# Patient Record
Sex: FEMALE | Race: WHITE | NOT HISPANIC OR LATINO
[De-identification: names, ages, dates, MRNs, and addresses within clinical notes are randomized per-mention and may not be internally consistent; named-entity substitution may affect disease eponyms.]

---

## 2018-05-08 ENCOUNTER — FORM ENCOUNTER (OUTPATIENT)
Age: 68
End: 2018-05-08

## 2018-06-26 ENCOUNTER — FORM ENCOUNTER (OUTPATIENT)
Age: 68
End: 2018-06-26

## 2018-07-17 ENCOUNTER — FORM ENCOUNTER (OUTPATIENT)
Age: 68
End: 2018-07-17

## 2019-01-01 ENCOUNTER — FORM ENCOUNTER (OUTPATIENT)
Age: 69
End: 2019-01-01

## 2019-05-13 ENCOUNTER — FORM ENCOUNTER (OUTPATIENT)
Age: 69
End: 2019-05-13

## 2020-06-23 ENCOUNTER — FORM ENCOUNTER (OUTPATIENT)
Age: 70
End: 2020-06-23

## 2021-06-11 ENCOUNTER — NON-APPOINTMENT (OUTPATIENT)
Age: 71
End: 2021-06-11

## 2021-06-23 ENCOUNTER — APPOINTMENT (OUTPATIENT)
Dept: BREAST CENTER | Facility: CLINIC | Age: 71
End: 2021-06-23
Payer: MEDICARE

## 2021-06-23 VITALS
OXYGEN SATURATION: 97 % | WEIGHT: 174.8 LBS | DIASTOLIC BLOOD PRESSURE: 75 MMHG | HEIGHT: 62.5 IN | HEART RATE: 69 BPM | SYSTOLIC BLOOD PRESSURE: 118 MMHG | BODY MASS INDEX: 31.36 KG/M2

## 2021-06-23 DIAGNOSIS — E11.9 TYPE 2 DIABETES MELLITUS W/OUT COMPLICATIONS: ICD-10-CM

## 2021-06-23 DIAGNOSIS — I10 ESSENTIAL (PRIMARY) HYPERTENSION: ICD-10-CM

## 2021-06-23 DIAGNOSIS — M51.26 OTHER INTERVERTEBRAL DISC DISPLACEMENT, LUMBAR REGION: ICD-10-CM

## 2021-06-23 DIAGNOSIS — Z80.3 FAMILY HISTORY OF MALIGNANT NEOPLASM OF BREAST: ICD-10-CM

## 2021-06-23 DIAGNOSIS — E78.00 PURE HYPERCHOLESTEROLEMIA, UNSPECIFIED: ICD-10-CM

## 2021-06-23 DIAGNOSIS — M19.90 UNSPECIFIED OSTEOARTHRITIS, UNSPECIFIED SITE: ICD-10-CM

## 2021-06-23 PROCEDURE — 99213 OFFICE O/P EST LOW 20 MIN: CPT

## 2021-06-23 NOTE — HISTORY OF PRESENT ILLNESS
[FreeTextEntry1] : 72 yo female previously under the care of Dr. Olivo (last eval was 6/24/2020) presents for a family history of breast cancer. Pt denies palpable masses, skin lesions/changes, nipple discharge, or other breast complaints.\par \par SLADE lifetime risk is 23.9%;Her family history is significant for breast cancer in her mother (dx age 65) and her m-aunt (dx age 50) and b-5-zx-cousin dx recent at age 30; no genetic testing has been completed. \par \par Discussed patients high risk status and recommendations for close surveillance. Patient understands and would like to be followed closely.\par \par Discussed importance and implication of genetic testing in regards to her family history and offspring. Patient would like to go forward with genetic counselor consult.\par \par Discussed self breast exams with the patient. Recommended simple pattern of palpation, while seated and while laying down.  Discussed characteristics of a suspicious mass, such as irregular, hard like a rock and fixed/immobile. Discussed that if she experiences nipple discharge, to come in sooner. Patient understands.\par

## 2021-06-23 NOTE — PAST MEDICAL HISTORY
[Menarche Age ____] : age at menarche was [unfilled] [Menopause Age____] : age at menopause was [unfilled] [Total Preg ___] : G[unfilled] [Live Births ___] : P[unfilled]  [Ectopics ___] : ectopic pregnancies: [unfilled]  [Age At Live Birth ___] : Age at live birth: [unfilled] [FreeTextEntry6] : yes [FreeTextEntry7] : no [FreeTextEntry8] : yes

## 2021-06-23 NOTE — DATA REVIEWED
[FreeTextEntry1] : 6/24/2020 ([facility]) b/l sMmg & US: heterogeneously dense. No mammographic or ultrasonographic evidence of malignancy. No significant change. BI-RADS 2.\par \par 6/23/21 ([facility]) b/l sMmg & US: Benign findings. No mammographic or ultrasonographic evidence of malignancy. No significant change. BI-RADS 2.

## 2021-06-24 ENCOUNTER — APPOINTMENT (OUTPATIENT)
Dept: BREAST CENTER | Facility: CLINIC | Age: 71
End: 2021-06-24

## 2021-12-08 ENCOUNTER — APPOINTMENT (OUTPATIENT)
Dept: BREAST CENTER | Facility: CLINIC | Age: 71
End: 2021-12-08

## 2021-12-08 ENCOUNTER — APPOINTMENT (OUTPATIENT)
Dept: HEMATOLOGY ONCOLOGY | Facility: CLINIC | Age: 71
End: 2021-12-08

## 2021-12-08 ENCOUNTER — NON-APPOINTMENT (OUTPATIENT)
Age: 71
End: 2021-12-08

## 2021-12-08 DIAGNOSIS — R92.2 INCONCLUSIVE MAMMOGRAM: ICD-10-CM

## 2022-10-06 ENCOUNTER — APPOINTMENT (OUTPATIENT)
Dept: BREAST CENTER | Facility: CLINIC | Age: 72
End: 2022-10-06

## 2022-10-06 VITALS
SYSTOLIC BLOOD PRESSURE: 126 MMHG | WEIGHT: 168.25 LBS | BODY MASS INDEX: 30.19 KG/M2 | HEART RATE: 75 BPM | DIASTOLIC BLOOD PRESSURE: 76 MMHG | HEIGHT: 62.5 IN

## 2022-10-06 DIAGNOSIS — Z78.9 OTHER SPECIFIED HEALTH STATUS: ICD-10-CM

## 2022-10-06 PROBLEM — R92.2 DENSE BREAST TISSUE: Status: ACTIVE | Noted: 2022-10-06

## 2022-10-06 PROCEDURE — 99213 OFFICE O/P EST LOW 20 MIN: CPT

## 2022-10-06 RX ORDER — METFORMIN HYDROCHLORIDE 625 MG/1
TABLET ORAL
Refills: 0 | Status: ACTIVE | COMMUNITY

## 2022-10-06 RX ORDER — ASPIRIN 81 MG
81 TABLET, DELAYED RELEASE (ENTERIC COATED) ORAL
Refills: 0 | Status: ACTIVE | COMMUNITY

## 2022-10-06 RX ORDER — PROPRANOLOL HYDROCHLORIDE 80 MG/1
TABLET ORAL
Refills: 0 | Status: ACTIVE | COMMUNITY

## 2022-10-06 RX ORDER — FERROUS SULFATE 325(65) MG
TABLET ORAL
Refills: 0 | Status: ACTIVE | COMMUNITY

## 2022-10-06 RX ORDER — SITAGLIPTIN AND METFORMIN HYDROCHLORIDE 50; 1000 MG/1; MG/1
50-1000 TABLET, FILM COATED ORAL
Refills: 0 | Status: ACTIVE | COMMUNITY

## 2022-10-06 RX ORDER — ATORVASTATIN CALCIUM 80 MG/1
80 TABLET, FILM COATED ORAL
Refills: 0 | Status: ACTIVE | COMMUNITY

## 2022-10-06 RX ORDER — PAROXETINE HYDROCHLORIDE 40 MG/1
TABLET, FILM COATED ORAL
Refills: 0 | Status: ACTIVE | COMMUNITY

## 2022-10-06 RX ORDER — DEXLANSOPRAZOLE 60 MG/1
60 CAPSULE, DELAYED RELEASE ORAL
Refills: 0 | Status: ACTIVE | COMMUNITY

## 2022-10-06 RX ORDER — VITAMIN B COMPLEX
CAPSULE ORAL
Refills: 0 | Status: ACTIVE | COMMUNITY

## 2022-10-06 RX ORDER — ERGOCALCIFEROL (VITAMIN D2) 1250 MCG
50000 CAPSULE ORAL
Refills: 0 | Status: ACTIVE | COMMUNITY

## 2022-10-06 RX ORDER — LORAZEPAM 2 MG/1
TABLET ORAL
Refills: 0 | Status: ACTIVE | COMMUNITY

## 2022-10-11 NOTE — PAST MEDICAL HISTORY
[Menarche Age ____] : age at menarche was [unfilled] [Menopause Age____] : age at menopause was [unfilled] [Total Preg ___] : G[unfilled] [Live Births ___] : P[unfilled]  [Ectopics ___] : ectopic pregnancies: [unfilled]  [Age At Live Birth ___] : Age at live birth: [unfilled] [History of Hormone Replacement Treatment] : has no history of hormone replacement treatment [FreeTextEntry6] : yes [FreeTextEntry7] : no [FreeTextEntry8] : yes

## 2022-10-11 NOTE — HISTORY OF PRESENT ILLNESS
[FreeTextEntry1] : 73 yo female presents for follow up with a family history of breast cancer. She complains of a new palpable lump in her right breast, first noticed about a month ago, also noticed by her PCP. Pt denies skin lesions/changes, nipple discharge, or other breast complaints.\par \par SLADE lifetime risk is 23.9%;Her family history is significant for breast cancer in her mother (dx age 65) and her m-aunt (dx age 50) and v-8-pm-cousin dx recent at age 30; no genetic testing has been completed. \par \par Discussed patients high risk status and recommendations for close surveillance. Patient understands and would like to be followed closely.\par \par Discussed importance and implication of genetic testing in regards to her family history and offspring. Patient would like to go forward with genetic counselor consult.\par \par Discussed self breast exams with the patient. Recommended simple pattern of palpation, while seated and while laying down.  Discussed characteristics of a suspicious mass, such as irregular, hard like a rock and fixed/immobile. Discussed that if she experiences nipple discharge, to come in sooner. Patient understands.\par

## 2022-10-11 NOTE — DATA REVIEWED
[FreeTextEntry1] : 6/24/2020 ([facility]) b/l sMmg & US: heterogeneously dense. No mammographic or ultrasonographic evidence of malignancy. No significant change. BI-RADS 2.\par \par 6/23/21 ([facility]) b/l sMmg & US: Benign findings. No mammographic or ultrasonographic evidence of malignancy. No significant change. BI-RADS 2.

## 2022-10-14 ENCOUNTER — NON-APPOINTMENT (OUTPATIENT)
Age: 72
End: 2022-10-14

## 2022-10-18 ENCOUNTER — NON-APPOINTMENT (OUTPATIENT)
Age: 72
End: 2022-10-18

## 2022-10-19 ENCOUNTER — NON-APPOINTMENT (OUTPATIENT)
Age: 72
End: 2022-10-19

## 2022-10-20 ENCOUNTER — RESULT REVIEW (OUTPATIENT)
Age: 72
End: 2022-10-20

## 2022-10-21 ENCOUNTER — NON-APPOINTMENT (OUTPATIENT)
Age: 72
End: 2022-10-21

## 2022-10-24 ENCOUNTER — APPOINTMENT (OUTPATIENT)
Dept: BREAST CENTER | Facility: CLINIC | Age: 72
End: 2022-10-24

## 2022-10-24 PROCEDURE — 99212 OFFICE O/P EST SF 10 MIN: CPT

## 2022-10-24 NOTE — DATA REVIEWED
[FreeTextEntry1] : 6/24/2020 ([facility]) b/l sMmg & US: heterogeneously dense. No mammographic or ultrasonographic evidence of malignancy. No significant change. BI-RADS 2.\par \par 6/23/21 ([facility]) b/l sMmg & US: Benign findings. No mammographic or ultrasonographic evidence of malignancy. No significant change. BI-RADS 2. \par \par 10/6/22 ([facility]) b/l dx mmg & US: heterogeneously dense. 1. Suspicious palpable architectural distortion at the right 11-12:00 axis which likely correlates with a region of abnormal echotexture spanning 1.5 cm on ultrasound at the 11:00 axis 4 cm from the nipple. Stereotactic biopsy is recommended. In addition clinical evaluation/surgical evaluation is recommended for the palpable finding.\par 2. Suspicious left 8:00 axis 1 cm from nipple 1 cm complex cluster of microcysts which had not been previously visualized. Ultrasound-guided needle biopsy is recommended.\par BI-RADS 4. \par \par 10/20/22 ([facility]) right stereo biopsy: results pending \par \par 10/20/22 ([facility]) US biopsy left: results pending

## 2022-10-24 NOTE — PHYSICAL EXAM
[Examined in the supine and seated position] : examined in the supine and seated position [No Axillary Lymphadenopathy] : no left axillary lymphadenopathy [No Edema] : no edema [Breast Nipple Inversion] : nipples not inverted [Breast Nipple Retraction] : nipples not retracted [Breast Nipple Flattening] : nipples not flattened [Breast Nipple Fissures] : nipples not fissured [de-identified] : ecchymosis bilaterally to biopsy sites [de-identified] : healing ecchymosis, with palpable mass at biopsy site, likely hematoma, measuring 3 cm; multiple small blisters to the breast

## 2022-10-24 NOTE — ASSESSMENT
[FreeTextEntry1] : 73 yo female presents for complaints of ecchymosis, pain, and a palpable lump and blisters on her right breast s/p biopsies on 10/20/22; I reviewed with her that the blisters are likely an allergic reaction to the adhesive that was on her breasts; they seem to be resolving so she was advised to keep the area clean, dry, and covered. \par \par Given that she was unable to come off blood thinners for her biopsy, reviewed that the palpable lump is likely a hematoma. Does not appear to be actively bleeding. Provided her with a compression bra and showed her how to apply extra compression with a dressing. She states that this is much more compressive than the ace bandage or bras she was wearing previously. Reviewed the use of cold compresses to help the healing process. Will touch base with her via telehealth in a few days to see if it is resolving. The patient verbalized understanding.

## 2022-10-24 NOTE — HISTORY OF PRESENT ILLNESS
[FreeTextEntry1] : 73 yo female presents for complaints of right pain and skin changes s/p right stereo on 10/20/22; she reports that she had blisters after removing the tape that was placed. States that the blisters have been getting smaller. She has been wearing a regular bra during the day, but states that for two days after the biopsy she was wearing an ace bandage. Denies fevers or chills. \par \par SLADE lifetime risk is 23.9%;Her family history is significant for breast cancer in her mother (dx age 65) and her m-aunt (dx age 50) and v-2-im-cousin dx recent at age 30; no genetic testing has been completed. \par \par Of note, the patient is on blood thinners, was unable to stop them prior to the biopsy (advised by her cardiologist)

## 2022-10-24 NOTE — REVIEW OF SYSTEMS
[Negative] : Heme/Lymph [As Noted in HPI] : as noted in HPI [Breast Pain] : breast pain [Breast Lump] : breast lump [Skin Lesions] : no skin lesions [Skin Wound] : no skin wound

## 2022-10-25 ENCOUNTER — NON-APPOINTMENT (OUTPATIENT)
Age: 72
End: 2022-10-25

## 2022-10-27 ENCOUNTER — NON-APPOINTMENT (OUTPATIENT)
Age: 72
End: 2022-10-27

## 2022-10-27 ENCOUNTER — APPOINTMENT (OUTPATIENT)
Dept: BREAST CENTER | Facility: CLINIC | Age: 72
End: 2022-10-27

## 2022-10-28 ENCOUNTER — APPOINTMENT (OUTPATIENT)
Dept: HEMATOLOGY ONCOLOGY | Facility: CLINIC | Age: 72
End: 2022-10-28

## 2022-10-28 ENCOUNTER — NON-APPOINTMENT (OUTPATIENT)
Age: 72
End: 2022-10-28

## 2022-10-28 NOTE — DISCUSSION/SUMMARY
[FreeTextEntry1] : The visit was provided via telehealth using real-time 2-way audio visual technology. The patient, Candy Dhillon, was located at home in [place], [place] at the time of the visit. The provider, Lani Lua, was located at the medical office in [place], [place] at the time of the visit. Verbal consent for telehealth services was given on 10/28/22 by the patient, Candy Dhillon.\par \par REASON FOR CONSULT\par Candy Dhillon is a 72-year-old female referred by Dr. Carole Walter for cancer genetic counseling and risk assessment due to a new diagnosis of breast cancer. Ms. Dhillon was seen via telehealth on 2022 at which time medical and family history was ascertained and a pedigree constructed. \par \par RELEVANT MEDICAL HISTORY\par Ms. Dhillon was recently diagnosed with a right breast cancer at the age of 72. Pathology report revealed invasive lobular carcinoma (ER+/AL+/HER2-) with LCIS. She is scheduled to meet with Dr. Garcia next week to discuss treatment planning. \par \par OTHER MEDICAL AND SURGICAL HISTORY:\par •	Medical History: DMII, HTN, HLD, heart issues\par •	Surgical History: , ectopic pregnancy\par \par OB/GYN HISTORY:\par Obstetrical History: \par Age at Menarche: 13\par Menopausal Status: Post-menopausal with LMP at age 56\par Age at First Live Birth: 33\par Oral Contraceptive Use: No\par Hormone Replacement Therapy: No\par \par CANCER SCREENING HISTORY:  \par Breast: \par •	Mammography/Sonography: 10/6/22- rec left and right biopsies\par •	Biopsies: 10/20/22- Right- ILC/LCIS, left- cystic apocrine metaplasia\par GYN:\par •	Pelvic Examination: Annual- reportedly wnl, history of clomid use \par Colon:\par •	Colonoscopy: - reportedly wnl, 1 polyp detected previously (pathology unknown), repeat in 5 years\par Skin:  \par •	FBSE: Yes\par •	Lesions biopsied/removed: No\par \par SOCIAL HISTORY:\par •	Tobacco-product use: No\par •	Environmental exposures: No \par \par FAMILY HISTORY:\par Maternal and paternal ancestry was reported as [place]. Sephardic Mosque ancestry was confirmed. A detailed family history of cancer was ascertained, see below and scanned chart for pedigree. \par \par According to Ms. Dhillon no one in the family has had germline testing for cancer susceptibility. Consanguinity was denied. \par 	\par RISK ASSESSMENT:\par Ms. Dhillon’s personal and family history is suggestive of a hereditary cancer syndrome given her new diagnosis of breast cancer, mother’s history of breast cancer, maternal aunt with breast cancer, and maternal cousin with breast cancer in her early 30s. The patient meets National Comprehensive Cancer Network (NCCN) criteria for genetic testing. Given that she plans to make surgical decisions based on results from genetic testing, we recommended the [facility] Breast STAT Panel testing for genes associated with breast cancer (typically results within 5-12 days). This test analyzes 9 genes: MICKY, BRCA1, BRCA2, CDH1, CHEK2, PALB2, PTEN, STK11, and TP53.\par \par The risks, benefits and limitations of genetic testing were discussed with Ms. Dhillon. In addition, we discussed the purpose of genetic testing and possible test results (positive, negative, inconclusive) along with associated medical management options and psychosocial implications. Insurance coverage and potential out of pocket costs were also discussed. \par \par It was explained that risk assessment is based upon medical and family history as provided and may change in the future should new information be obtained. \par \par Following our discussion, Ms. Dhillon consented to the above-mentioned genetic testing panel. Ms. Dhillon stated she would prefer to submit a blood sample for testing. Blood will be drawn at her appointment with Dr. Garcia on  and sent to [facility] for analysis.\par \par PLAN:\par \par 1.	Blood will be drawn at her appointment with Dr. Garcia on  and sent to [facility] for analysis. \par 2.	We will contact Ms. Dhillon to schedule a follow-up appointment once the results are available. Results generally return in 5-12 days after the lab has received the patient’s sample. \par \par For any additional questions please call [facility] Genetics at [phone]. \par \par \par Lani Lua MS, [facility]\par Genetic Counselor, Cancer Genetics\par \par \par \par \par \par

## 2022-11-01 ENCOUNTER — APPOINTMENT (OUTPATIENT)
Dept: BREAST CENTER | Facility: CLINIC | Age: 72
End: 2022-11-01
Payer: MEDICARE

## 2022-11-01 ENCOUNTER — TRANSCRIPTION ENCOUNTER (OUTPATIENT)
Age: 72
End: 2022-11-01

## 2022-11-01 VITALS
HEIGHT: 62 IN | DIASTOLIC BLOOD PRESSURE: 78 MMHG | BODY MASS INDEX: 30.55 KG/M2 | WEIGHT: 166 LBS | SYSTOLIC BLOOD PRESSURE: 136 MMHG | HEART RATE: 73 BPM

## 2022-11-01 DIAGNOSIS — N64.89 OTHER SPECIFIED DISORDERS OF BREAST: ICD-10-CM

## 2022-11-01 PROCEDURE — 76942 ECHO GUIDE FOR BIOPSY: CPT | Mod: RT

## 2022-11-01 PROCEDURE — 99215 OFFICE O/P EST HI 40 MIN: CPT | Mod: 25

## 2022-11-01 PROCEDURE — 10160 PNXR ASPIR ABSC HMTMA BULLA: CPT

## 2022-11-01 PROCEDURE — 93702 BIS XTRACELL FLUID ANALYSIS: CPT

## 2022-11-01 PROCEDURE — 76642 ULTRASOUND BREAST LIMITED: CPT

## 2022-11-04 NOTE — ASSESSMENT
[FreeTextEntry1] : 71 yo female presents for newly diagnosed R invasive lobular carcinoma % % Her-2 negative, LCIS and focally involving radial scar, seen on diagnostic imaging as architectural distortion at R 11:00, originally palpated by patient. Benign, concordant US-guided biopsy performed same day of L 0.9cm complex cyst with recommendation for 6 month follow-up US. \par \par Discussed importance and implication of genetic testing in regards to her family history and offspring. Patient met with BRETT Garibay via telehealth 10/28/22, will draw blood for [facility] genetic testing today. Enrolled patient in [employer] study, consent obtained and a copy given to patient. \par \par Patient with likely post-op hematoma noted in physical exam today, advised patient to switch from cold compresses to warm wet compresses 4x daily for 20-30 minutes each. Bedside US-guided R breast aspiration of hematoma performed in office, 3cc of aspirate removed without complication today. \par \par Discussed patient's diagnosis in detail and answered any questions. Patient instructed by cardiologist to continue for 6 months until December 14, 2022. Patient to consult with medical oncology to discuss possible neoadjuvant endocrine therapy until surgery can be performed. Patient will have B/L MRI to assess extent of disease and plan for R lumpectomy and SLNB, pending PCP clearance. Surgical consent obtained today, surgical coordinator aware, contact consent to speak with daughter obtained today. Baseline sozo measurement obtained today. Patient verbalized understanding and agreement of plan.\par \par

## 2022-11-04 NOTE — REVIEW OF SYSTEMS
[Breast Pain] : breast pain [Breast Lump] : breast lump [Negative] : Heme/Lymph [As Noted in HPI] : as noted in HPI [Skin Lesions] : no skin lesions [Skin Wound] : no skin wound

## 2022-11-04 NOTE — HISTORY OF PRESENT ILLNESS
[FreeTextEntry1] : 71 yo female presents for newly diagnosed R invasive lobular carcinoma % % Her-2 negative, LCIS and focally involving radial scar, seen on diagnostic imaging as architectural distortion at R 11:00 spanning 1.5cm on US, originally palpated by patient. Benign, concordant US-guided biopsy performed same day of L 0.9cm complex cyst with recommendation for 6 month follow-up US. Patient has not had a breast MRI. \par \par Patient last seen by Mesha Araujo NP in office 10/27/22 with complaints of right pain and skin changes s/p right stereo on 10/20/22, likely due to reaction to adhesive and post-procedure hematoma. \par \par Her family history is significant for breast cancer in her mother (dx age 65) and her m-aunt (dx age 50) and e-8-oh-cousin dx recent at age 30; patient met with BRETT Garibay via telehealth 10/28/22 with plan to proceed with bloodwork for [facility] genetic testing today.\par \par Of note, the patient is on blood thinners, was unable to stop them prior to the biopsy (advised by her cardiologist) \par \par Patient reports history of diabetes, HLD, HTN, tremor. Of note, patient on blood thinners Prasugrel and baby aspirin as patient had a stent placed in June 2022, instructed by cardiologist to continue for 6 months until December 14, 2022. Patient with allergy to penicillin injections rxn: syncope, tegaderm rxn: blisters, epinephrine, rxn; syncope. \par Patient with partial dentures in top and bottom. \par \par Patient denies history of sleep apnea, COPD, loose/missing/infected teeth, issues with anesthesia. \par

## 2022-11-04 NOTE — PHYSICAL EXAM
[Examined in the supine and seated position] : examined in the supine and seated position [No Axillary Lymphadenopathy] : no left axillary lymphadenopathy [No Edema] : no edema [Supple] : supple [No Supraclavicular Adenopathy] : no supraclavicular adenopathy [Breast Nipple Inversion] : nipples not inverted [Breast Nipple Retraction] : nipples not retracted [Breast Nipple Flattening] : nipples not flattened [Breast Nipple Fissures] : nipples not fissured [de-identified] : ecchymosis bilaterally to biopsy sites [de-identified] : healing ecchymosis, with palpable mass at biopsy site, likely hematoma, measuring 3 cm; multiple small blisters to the breast

## 2022-11-04 NOTE — PROCEDURE
[FreeTextEntry1] : US-guided Bedside R breast Aspiration [FreeTextEntry2] : Post-biopsy hematoma [FreeTextEntry3] : Technique: patient was prepped and draped, cleansed with alcohol, ultrasound gel applied, intended site of aspiration was located, using a 25G needle, a total of 5cc of 1% lidocaine/epinephrine was injected for local anesthetic, using ultrasound guidance, an 18G needle was introduced into site of hematoma with a total of 3cc of bloody fluid aspirated\par The patient tolerated the procedure well, no complications.\par

## 2022-11-04 NOTE — DATA REVIEWED
[FreeTextEntry1] : 6/24/2020 ([facility]) b/l sMmg & US: heterogeneously dense. No mammographic or ultrasonographic evidence of malignancy. No significant change. BI-RADS 2.\par \par 6/23/21 ([facility]) b/l sMmg & US: Benign findings. No mammographic or ultrasonographic evidence of malignancy. No significant change. BI-RADS 2. \par \par 10/6/22 ([facility]) b/l dx mmg & US: heterogeneously dense. 1. Suspicious palpable architectural distortion at the right 11-12:00 axis which likely correlates with a region of abnormal echotexture spanning 1.5 cm on ultrasound at the 11:00 axis 4 cm from the nipple. Stereotactic biopsy is recommended. In addition clinical evaluation/surgical evaluation is recommended for the palpable finding.\par 2. Suspicious left 8:00 axis 1 cm from nipple 1 cm complex cluster of microcysts which had not been previously visualized. Ultrasound-guided needle biopsy is recommended.\par BI-RADS 4. \par \par 10/20/22 ([facility]) right stereo biopsy of architectural distortion at the right 11:00 axis (hourglass clip): Invasive lobular carcinoma, 5 mm in greatest microscopic dimension. Lobular carcinoma in situ, focally involving radial scar. Benign calcifications. Malignant, concordant. \par \par 10/20/22 ([facility]) US biopsy left of  0.9 x 0.3 x 0.8 complex cyst at the left 8:00 axis 1cmfn (heart-shaped clip): Cystic apocrine metaplasia. Benign, concordant. FOLLOW-UP: A six-month follow up  ultrasound is recommended.

## 2022-11-16 ENCOUNTER — NON-APPOINTMENT (OUTPATIENT)
Age: 72
End: 2022-11-16

## 2022-11-28 ENCOUNTER — NON-APPOINTMENT (OUTPATIENT)
Age: 72
End: 2022-11-28

## 2022-12-05 ENCOUNTER — APPOINTMENT (OUTPATIENT)
Dept: HEMATOLOGY ONCOLOGY | Facility: CLINIC | Age: 72
End: 2022-12-05

## 2022-12-05 ENCOUNTER — LABORATORY RESULT (OUTPATIENT)
Age: 72
End: 2022-12-05

## 2022-12-05 VITALS
HEIGHT: 62 IN | WEIGHT: 165 LBS | HEART RATE: 72 BPM | BODY MASS INDEX: 30.36 KG/M2 | DIASTOLIC BLOOD PRESSURE: 70 MMHG | RESPIRATION RATE: 18 BRPM | TEMPERATURE: 98.6 F | OXYGEN SATURATION: 96 % | SYSTOLIC BLOOD PRESSURE: 127 MMHG

## 2022-12-05 DIAGNOSIS — Z01.818 ENCOUNTER FOR OTHER PREPROCEDURAL EXAMINATION: ICD-10-CM

## 2022-12-05 DIAGNOSIS — R92.8 OTHER ABNORMAL AND INCONCLUSIVE FINDINGS ON DIAGNOSTIC IMAGING OF BREAST: ICD-10-CM

## 2022-12-05 PROCEDURE — 99204 OFFICE O/P NEW MOD 45 MIN: CPT

## 2022-12-05 RX ORDER — NEOMYCIN AND POLYMYXIN B SULFATES, BACITRACIN ZINC, AND HYDROCORTISONE 3.5; 5000; 400; 1 MG/G; [IU]/G; [IU]/G; MG/G
OINTMENT TOPICAL
Refills: 0 | Status: DISCONTINUED | COMMUNITY
End: 2022-12-05

## 2022-12-05 RX ORDER — DAPAGLIFLOZIN 5 MG/1
5 TABLET, FILM COATED ORAL
Qty: 30 | Refills: 0 | Status: ACTIVE | COMMUNITY
Start: 2022-10-30

## 2022-12-05 RX ORDER — DENOSUMAB 60 MG/ML
60 INJECTION SUBCUTANEOUS
Qty: 1 | Refills: 0 | Status: ACTIVE | COMMUNITY
Start: 2022-11-30

## 2022-12-05 NOTE — HISTORY OF PRESENT ILLNESS
[de-identified] : 73yo woman with PMHx including DM, HTN, CAD s/p PCI (with stent placement in June 2022), osteopenia on Prolia (managed by endo, Dr José Luis Beckwith) referred by Dr Garcia for a newly diagnosed right ILC % % Her 2 negative, LCIS and focally involving radial scar, seen on 11/2022 MRI breast as a 1.7cm mass in right upper inner quadrant and an 8mm enhancing structure in right axillary tail w/o fatty hilum, possibly abnormal node, s/p US axilla 11/22/22 without any abnormal lymphadenopathy seen. \par \par Patient currently on Prasugrel and baby aspirin at least until 12/14/2022 due to a stent which was placed June 2022. Patient awaiting clearance from PMD and cardiologist prior to breast surgery. Patient referred by Dr Amadou Garcia for consideration of neoadjuvant antiestrogen therapy prior to surgery.  [de-identified] : Patient presents for initial consultation. Patient states they tested positive for Covid -19 last Monday and got prescribed a 3 day antibiotic. The antibiotics have been causing stomach cramping and diarrhea, but her doctor assured her it will pass after 10 days. Patient reports improvement in her loose stools/abd cramps, also reports resolution of cough, improvement in fatigue. [FreeTextEntry1] : 73 yo female presents for newly diagnosed R invasive lobular carcinoma % % Her-2 negative, LCIS and focally involving radial scar, seen on diagnostic imaging as architectural distortion at R 11:00 spanning 1.5cm on US, originally palpated by patient. Benign, concordant US-guided biopsy performed same day of L 0.9cm complex cyst with recommendation for 6 month follow-up US. Patient has not had a breast MRI. \par \par Patient last seen by Mesha Araujo NP in office 10/27/22 with complaints of right pain and skin changes s/p right stereo on 10/20/22, likely due to reaction to adhesive and post-procedure hematoma. \par \par Her family history is significant for breast cancer in her mother (dx age 65) and her m-aunt (dx age 50) and f-8-vk-cousin dx recent at age 30; patient met with BRETT Garibay via telehealth 10/28/22 with plan to proceed with bloodwork for [facility] genetic testing today.\par \par Of note, the patient is on blood thinners, was unable to stop them prior to the biopsy (advised by her cardiologist) \par \par Patient reports history of diabetes, HLD, HTN, tremor. Of note, patient on blood thinners Prasugrel and baby aspirin as patient had a stent placed in June 2022, instructed by cardiologist to continue for 6 months until December 14, 2022. Patient with allergy to penicillin injections rxn: syncope, tegaderm rxn: blisters, epinephrine, rxn; syncope. \par Patient with partial dentures in top and bottom. \par \par Patient denies history of sleep apnea, COPD, loose/missing/infected teeth, issues with anesthesia. \par

## 2022-12-05 NOTE — REVIEW OF SYSTEMS
[Cough] : cough [Abdominal Pain] : abdominal pain [Diarrhea] : diarrhea [Negative] : Allergic/Immunologic [FreeTextEntry6] : cough with covid, resolved  [FreeTextEntry7] : diarrhea with covid, getting better

## 2022-12-05 NOTE — ASSESSMENT
[FreeTextEntry1] : 71yo woman with PMHx including DM, HTN, CAD s/p PCI (with stent placement in June 2022), osteopenia on Prolia (managed by endo, Dr José Luis Beckwith) referred by Dr Garcia for a newly diagnosed right ILC % % Her 2 negative, LCIS and focally involving radial scar, seen on 11/2022 MRI breast as a 1.7cm mass in right upper inner quadrant and an 8mm enhancing structure in right axillary tail w/o fatty hilum, possibly abnormal node, s/p US axilla 11/22/22 without any abnormal lymphadenopathy seen. \par \par Patient currently on Prasugrel and baby aspirin at least until 12/14/2022 due to a stent which was placed June 2022. Patient awaiting clearance from PMD and cardiologist prior to breast surgery. Patient referred by Dr Amadou Garcia for consideration of neoadjuvant antiestrogen therapy prior to surgery. \par \par Pt s/p visit with genetic counselor Lani Lua, [facility] test result negative 11/2022. \par \par \par Reviewed general management of early stage hormone receptor positive breast cancer that is isolated to the breast with patient and her daughter in law who was present at initial visit on 12/5/22. I reviewed that surgery is the only curative modality and an integral part of her breast cancer treatment; once medically optimized and cleared for surgery (by PMD, cardiologist) she should undergo surgery with Dr Amadou Garcia. During the process of continuing dual antiplatelet therapy and being medically optimized for surgery, I recommended she undergo neoadjuvant endocrine therapy with Arimidex 1mg/day with periodic physical exams to assess response to therapy. I reviewed goal of antiestrogen therapy before surgery will be to shrink the mass to achieve a better surgical outcome and to give her time with more of a piece of mind to prepare for the surgery/complete dual antiplatelet therapy. I reviewed the potential side effects of Arimidex including but not limited to hot flashes, body aches and pains, decrease in bone density and worsened lipid profile. \par \par I asked patient to take Arimidex 1mg/day with vitamin D/Ca as well (which patient reported she is already on due to Prolia use). \par Will also obtain latest DEXA result from her endocrinologist. \par \par RTC in 6-8 weeks to assess response to therapy. \par \par

## 2022-12-21 ENCOUNTER — APPOINTMENT (OUTPATIENT)
Dept: NUCLEAR MEDICINE | Facility: HOSPITAL | Age: 72
End: 2022-12-21

## 2022-12-21 ENCOUNTER — RESULT REVIEW (OUTPATIENT)
Age: 72
End: 2022-12-21

## 2022-12-21 ENCOUNTER — OUTPATIENT (OUTPATIENT)
Dept: OUTPATIENT SERVICES | Facility: HOSPITAL | Age: 72
LOS: 1 days | End: 2022-12-21
Payer: MEDICARE

## 2022-12-21 ENCOUNTER — TRANSCRIPTION ENCOUNTER (OUTPATIENT)
Age: 72
End: 2022-12-21

## 2022-12-21 DIAGNOSIS — Z98.891 HISTORY OF UTERINE SCAR FROM PREVIOUS SURGERY: Chronic | ICD-10-CM

## 2022-12-21 DIAGNOSIS — Z87.59 PERSONAL HISTORY OF OTHER COMPLICATIONS OF PREGNANCY, CHILDBIRTH AND THE PUERPERIUM: Chronic | ICD-10-CM

## 2022-12-21 PROCEDURE — 78195 LYMPH SYSTEM IMAGING: CPT | Mod: 26,MD

## 2022-12-21 NOTE — ASU PATIENT PROFILE, ADULT - NSICDXPASTMEDICALHX_GEN_ALL_CORE_FT
PAST MEDICAL HISTORY:  2019 novel coronavirus disease (COVID-19) 3-4 weeks agouti    Anxiety and depression     Arthritis     Breast cancer     CAD (coronary artery disease)  stent placement    Chronic UTI     H/O dizziness     H/O urinary frequency     Heart attack mild     High blood pressure     Type 2 diabetes mellitus

## 2022-12-21 NOTE — ASU PATIENT PROFILE, ADULT - NSICDXPASTSURGICALHX_GEN_ALL_CORE_FT
PAST SURGICAL HISTORY:  S/P      S/P ectopic pregnancy      PAST SURGICAL HISTORY:  H/O excision of mass back tumor    S/P      S/P ectopic pregnancy     Stented coronary artery

## 2022-12-21 NOTE — ASU PATIENT PROFILE, ADULT - NS PREOP UNDERSTANDS INFO
Bring photo id , insurance card, no food after 22:00 today can have water till 05:30 AM, no chewing gum no candy after 2200 pm. Wear loose comfort cloths , no jewelry, no valuables./yes

## 2022-12-21 NOTE — ASU PATIENT PROFILE, ADULT - FALL HARM RISK - UNIVERSAL INTERVENTIONS
Bed in lowest position, wheels locked, appropriate side rails in place/Call bell, personal items and telephone in reach/Instruct patient to call for assistance before getting out of bed or chair/Non-slip footwear when patient is out of bed/[place] to call system/Physically safe environment - no spills, clutter or unnecessary equipment/Purposeful Proactive Rounding/Room/bathroom lighting operational, light cord in reach

## 2022-12-22 ENCOUNTER — TRANSCRIPTION ENCOUNTER (OUTPATIENT)
Age: 72
End: 2022-12-22

## 2022-12-22 ENCOUNTER — RESULT REVIEW (OUTPATIENT)
Age: 72
End: 2022-12-22

## 2022-12-22 ENCOUNTER — APPOINTMENT (OUTPATIENT)
Dept: BREAST CENTER | Facility: AMBULATORY SURGERY CENTER | Age: 72
End: 2022-12-22

## 2022-12-22 ENCOUNTER — OUTPATIENT (OUTPATIENT)
Dept: OUTPATIENT SERVICES | Facility: HOSPITAL | Age: 72
LOS: 1 days | Discharge: HOME | End: 2022-12-22

## 2022-12-22 VITALS
RESPIRATION RATE: 15 BRPM | OXYGEN SATURATION: 98 % | DIASTOLIC BLOOD PRESSURE: 75 MMHG | SYSTOLIC BLOOD PRESSURE: 132 MMHG | HEART RATE: 68 BPM

## 2022-12-22 VITALS
RESPIRATION RATE: 16 BRPM | TEMPERATURE: 98 F | SYSTOLIC BLOOD PRESSURE: 151 MMHG | WEIGHT: 162.92 LBS | HEART RATE: 70 BPM | HEIGHT: 62.5 IN | DIASTOLIC BLOOD PRESSURE: 70 MMHG | OXYGEN SATURATION: 97 %

## 2022-12-22 DIAGNOSIS — Z98.890 OTHER SPECIFIED POSTPROCEDURAL STATES: Chronic | ICD-10-CM

## 2022-12-22 DIAGNOSIS — Z98.891 HISTORY OF UTERINE SCAR FROM PREVIOUS SURGERY: Chronic | ICD-10-CM

## 2022-12-22 DIAGNOSIS — Z95.5 PRESENCE OF CORONARY ANGIOPLASTY IMPLANT AND GRAFT: Chronic | ICD-10-CM

## 2022-12-22 DIAGNOSIS — Z87.59 PERSONAL HISTORY OF OTHER COMPLICATIONS OF PREGNANCY, CHILDBIRTH AND THE PUERPERIUM: Chronic | ICD-10-CM

## 2022-12-22 LAB — GLUCOSE BLDC GLUCOMTR-MCNC: 102 MG/DL — HIGH (ref 70–99)

## 2022-12-22 PROCEDURE — 38900 IO MAP OF SENT LYMPH NODE: CPT | Mod: RT

## 2022-12-22 PROCEDURE — 88307 TISSUE EXAM BY PATHOLOGIST: CPT | Mod: 26

## 2022-12-22 PROCEDURE — 38525 BIOPSY/REMOVAL LYMPH NODES: CPT | Mod: RT

## 2022-12-22 PROCEDURE — A9541: CPT

## 2022-12-22 PROCEDURE — 78195 LYMPH SYSTEM IMAGING: CPT | Mod: MD

## 2022-12-22 PROCEDURE — 88305 TISSUE EXAM BY PATHOLOGIST: CPT | Mod: 26

## 2022-12-22 PROCEDURE — 19301 PARTIAL MASTECTOMY: CPT | Mod: RT

## 2022-12-22 PROCEDURE — 76098 X-RAY EXAM SURGICAL SPECIMEN: CPT | Mod: 26

## 2022-12-22 DEVICE — CLIP APPLIER ETHICON LIGACLIP 9 3/8" MEDIUM: Type: IMPLANTABLE DEVICE | Site: RIGHT | Status: FUNCTIONAL

## 2022-12-22 DEVICE — CLIP APPLIER ETHICON LIGACLIP 9 3/8" SMALL: Type: IMPLANTABLE DEVICE | Site: RIGHT | Status: FUNCTIONAL

## 2022-12-22 RX ORDER — MEPERIDINE HYDROCHLORIDE 50 MG/ML
12.5 INJECTION INTRAMUSCULAR; INTRAVENOUS; SUBCUTANEOUS ONCE
Refills: 0 | Status: DISCONTINUED | OUTPATIENT
Start: 2022-12-22 | End: 2022-12-22

## 2022-12-22 RX ORDER — DEXLANSOPRAZOLE 30 MG/1
1 CAPSULE, DELAYED RELEASE ORAL
Qty: 0 | Refills: 0 | DISCHARGE

## 2022-12-22 RX ORDER — APREPITANT 80 MG/1
40 CAPSULE ORAL ONCE
Refills: 0 | Status: COMPLETED | OUTPATIENT
Start: 2022-12-22 | End: 2022-12-22

## 2022-12-22 RX ORDER — OXYCODONE HYDROCHLORIDE 5 MG/1
5 TABLET ORAL ONCE
Refills: 0 | Status: DISCONTINUED | OUTPATIENT
Start: 2022-12-22 | End: 2022-12-22

## 2022-12-22 RX ORDER — ONDANSETRON 8 MG/1
4 TABLET, FILM COATED ORAL ONCE
Refills: 0 | Status: DISCONTINUED | OUTPATIENT
Start: 2022-12-22 | End: 2022-12-22

## 2022-12-22 RX ORDER — HYDROMORPHONE HYDROCHLORIDE 2 MG/ML
0.5 INJECTION INTRAMUSCULAR; INTRAVENOUS; SUBCUTANEOUS
Refills: 0 | Status: DISCONTINUED | OUTPATIENT
Start: 2022-12-22 | End: 2022-12-22

## 2022-12-22 RX ORDER — DAPAGLIFLOZIN 10 MG/1
1 TABLET, FILM COATED ORAL
Qty: 0 | Refills: 0 | DISCHARGE

## 2022-12-22 RX ORDER — BENZOYL PEROXIDE MICRONIZED 5.8 %
1 TOWELETTE (EA) TOPICAL
Qty: 0 | Refills: 0 | DISCHARGE

## 2022-12-22 RX ORDER — CHOLECALCIFEROL (VITAMIN D3) 125 MCG
1 CAPSULE ORAL
Qty: 0 | Refills: 0 | DISCHARGE

## 2022-12-22 RX ORDER — ACETAMINOPHEN 500 MG
1000 TABLET ORAL ONCE
Refills: 0 | Status: DISCONTINUED | OUTPATIENT
Start: 2022-12-22 | End: 2023-01-05

## 2022-12-22 RX ORDER — SODIUM CHLORIDE 9 MG/ML
1000 INJECTION, SOLUTION INTRAVENOUS
Refills: 0 | Status: DISCONTINUED | OUTPATIENT
Start: 2022-12-22 | End: 2022-12-22

## 2022-12-22 RX ORDER — METFORMIN HYDROCHLORIDE 850 MG/1
1 TABLET ORAL
Qty: 0 | Refills: 0 | DISCHARGE

## 2022-12-22 RX ORDER — ATORVASTATIN CALCIUM 80 MG/1
1 TABLET, FILM COATED ORAL
Qty: 0 | Refills: 0 | DISCHARGE

## 2022-12-22 RX ORDER — PRASUGREL 5 MG/1
1 TABLET, FILM COATED ORAL
Qty: 0 | Refills: 0 | DISCHARGE

## 2022-12-22 RX ORDER — SITAGLIPTIN AND METFORMIN HYDROCHLORIDE 500; 50 MG/1; MG/1
1 TABLET, FILM COATED ORAL
Qty: 0 | Refills: 0 | DISCHARGE

## 2022-12-22 RX ORDER — ACETAMINOPHEN 500 MG
1000 TABLET ORAL ONCE
Refills: 0 | Status: COMPLETED | OUTPATIENT
Start: 2022-12-22 | End: 2022-12-22

## 2022-12-22 RX ADMIN — Medication 1000 MILLIGRAM(S): at 06:59

## 2022-12-22 RX ADMIN — Medication 1000 MILLIGRAM(S): at 06:15

## 2022-12-22 RX ADMIN — APREPITANT 40 MILLIGRAM(S): 80 CAPSULE ORAL at 06:15

## 2022-12-22 NOTE — BRIEF OPERATIVE NOTE - NSICDXBRIEFPROCEDURE_GEN_ALL_CORE_FT
PROCEDURES:  Lumpectomy of right breast with sentinel node biopsy 22-Dec-2022 10:28:54  Luigi Obrien

## 2022-12-22 NOTE — BRIEF OPERATIVE NOTE - OPERATION/FINDINGS
Skin prepped and draped in sterile fashion; Breast mass identified via magseed localization pre-op; Seed identified with probe, and skin marked; incision made and flaps elevated superior and inferior; mass reidentified with probe; Mass excised through incision. Clip and magseed identified on intra-op Faxitron image; right axillary incision made, and sentinel lymph nodes x6 identified, all hot and blue; Hemostasis achieved. Both incisions closed with 2-0 / 4-0 vicryl and 5-0 monocryl.

## 2022-12-22 NOTE — ASU DISCHARGE PLAN (ADULT/PEDIATRIC) - NS MD DC FALL RISK RISK
For information on Fall & Injury Prevention, visit: [email or web address] OR  [email or web address] OR  https://www.cdc.gov/steadi/patient.html

## 2022-12-22 NOTE — BRIEF OPERATIVE NOTE - SPECIMENS
Right breast lumpectomy, right sentinel lymph nodes x6 hot and blue, additional superior, inferior, lateral, medial, deep, and anterior margins, clip marks new margin

## 2022-12-22 NOTE — BRIEF OPERATIVE NOTE - NSICDXBRIEFPOSTOP_GEN_ALL_CORE_FT
POST-OP DIAGNOSIS:  Invasive lobular carcinoma of right breast in female 22-Dec-2022 10:29:18  Luigi Obrien

## 2022-12-22 NOTE — PRE-ANESTHESIA EVALUATION ADULT - RESPIRATORY RATE (BREATHS/MIN)
[address] [address]  Transitional Care Unit  Team Conference Note    Patient Name: Shirlene Archuleta   MRN: [id number]    : 1939  (75 y.o.)  Gender: female   Referring Practitioner: Ordering: Khurram Ballard MD; Attending: Fili Hilario MD  Diagnosis: SBO s/p bowel resection    Team Conference Date: 2018   Admission Date:     9:84 PM  Re-Certification Date:     :  Tentative Discharge Plan and current psychosocial issues: Attempts are being made to starting weaning patient from TPN,, however, patient's appetite is not good, and she is not eating enough. She has complaints of abdominal pain, and this seems to be affecting her progress in therapies over the past few days. Nursing:     Weight:  Weight: losing weight but possible scale issue     Wounds/Incisions/Ulcers:  Incision healing well  Bowel: ostomy with high output  Bladder:continent     Pain: Patient's pain currently uncontrolled and adjustments will be made as follows: increase morphine dose    Education Provided:  Diabetic:  No  Peg Tube:No    Stearns Care:  Education:NA  Removal Necessary:  NA  Supplies Needed: NA     Anticoagulant Use:  Patient not on anticoagulants. Antibiotic Use:  Patient not on antibiotics    Psychotropic Medication Use:  Patient not on psychotropic medications.     Oxygen Use: No    Home Medications Reconciled: N/A    Physical Therapy:   Bed Mobility  Scooting: Modified independent (scooting hips towards middle of bed)  Transfers  Sit to Stand: Stand by assistance (very slow, takes time)  Stand to sit: Contact guard assistance, Stand by assistance (cga-> close sba)  Comment: pt very slow with all movement  Ambulation 1  Surface: level tile  Device: Rolling Walker  Other Apparatus: Wheelchair follow  Assistance: Stand by assistance  Quality of Gait: decreased jon, decrease step length and height, forward flexed posture, downward gaze, steady, no LOB, very slow 16

## 2022-12-22 NOTE — PRE-ANESTHESIA EVALUATION ADULT - NSANTHPMHFT_GEN_ALL_CORE
>4 METS activity ,right breast cancer >4 METS activity ,off antiplatelet therapy per internist instruction, right breast cancer

## 2022-12-22 NOTE — ASU DISCHARGE PLAN (ADULT/PEDIATRIC) - ASU DC SPECIAL INSTRUCTIONSFT
-Surgical incision care: keep binder and dressing in place for 48 hours; may then remove and shower; steri strips will fall off on their own  -No heavy lifting >5lbs or strenous activity for 2 weeks  -May have clear liquid diet and advance to regular as tolerated  -Tylenol extra-strength and motrin every 6 hours for pain control  -Do not drive or operate heavy machinery x24 hours after procedure  -Do not drive or operate heavy machinery while on prescription pain medication  -Call doctor for: Temperature > 101.5, Excessive swelling on surgery site(s), nausea, vomiting, fainting

## 2022-12-22 NOTE — BRIEF OPERATIVE NOTE - NSICDXBRIEFPREOP_GEN_ALL_CORE_FT
PRE-OP DIAGNOSIS:  Invasive lobular carcinoma of right breast in female 22-Dec-2022 10:29:07  Luigi Obrien

## 2022-12-22 NOTE — ASU DISCHARGE PLAN (ADULT/PEDIATRIC) - CARE PROVIDER_API CALL
Amadou Garcia)  Surgery  [address] [address]  Phone: [phone]  Fax: [phone]  Follow Up Time:

## 2022-12-28 ENCOUNTER — APPOINTMENT (OUTPATIENT)
Dept: BREAST CENTER | Facility: CLINIC | Age: 72
End: 2022-12-28
Payer: MEDICARE

## 2022-12-28 VITALS
DIASTOLIC BLOOD PRESSURE: 75 MMHG | WEIGHT: 162 LBS | HEART RATE: 79 BPM | HEIGHT: 62 IN | BODY MASS INDEX: 29.81 KG/M2 | SYSTOLIC BLOOD PRESSURE: 115 MMHG

## 2022-12-28 DIAGNOSIS — N64.4 MASTODYNIA: ICD-10-CM

## 2022-12-28 PROBLEM — C50.919 MALIGNANT NEOPLASM OF UNSPECIFIED SITE OF UNSPECIFIED FEMALE BREAST: Chronic | Status: ACTIVE | Noted: 2022-12-21

## 2022-12-28 PROBLEM — I25.10 ATHEROSCLEROTIC HEART DISEASE OF NATIVE CORONARY ARTERY WITHOUT ANGINA PECTORIS: Chronic | Status: ACTIVE | Noted: 2022-12-21

## 2022-12-28 PROBLEM — Z87.898 PERSONAL HISTORY OF OTHER SPECIFIED CONDITIONS: Chronic | Status: ACTIVE | Noted: 2022-12-21

## 2022-12-28 PROBLEM — N39.0 URINARY TRACT INFECTION, SITE NOT SPECIFIED: Chronic | Status: ACTIVE | Noted: 2022-12-21

## 2022-12-28 PROBLEM — I21.9 ACUTE MYOCARDIAL INFARCTION, UNSPECIFIED: Chronic | Status: ACTIVE | Noted: 2022-12-21

## 2022-12-28 PROBLEM — F41.9 ANXIETY DISORDER, UNSPECIFIED: Chronic | Status: ACTIVE | Noted: 2022-12-21

## 2022-12-28 PROBLEM — I10 ESSENTIAL (PRIMARY) HYPERTENSION: Chronic | Status: ACTIVE | Noted: 2022-12-21

## 2022-12-28 PROBLEM — M19.90 UNSPECIFIED OSTEOARTHRITIS, UNSPECIFIED SITE: Chronic | Status: ACTIVE | Noted: 2022-12-21

## 2022-12-28 PROBLEM — U07.1 COVID-19: Chronic | Status: ACTIVE | Noted: 2022-12-21

## 2022-12-28 PROBLEM — E11.9 TYPE 2 DIABETES MELLITUS WITHOUT COMPLICATIONS: Chronic | Status: ACTIVE | Noted: 2022-12-21

## 2022-12-28 PROCEDURE — 99024 POSTOP FOLLOW-UP VISIT: CPT

## 2022-12-29 LAB — SURGICAL PATHOLOGY STUDY: SIGNIFICANT CHANGE UP

## 2022-12-30 NOTE — PHYSICAL EXAM
[Supple] : supple [No Supraclavicular Adenopathy] : no supraclavicular adenopathy [Examined in the supine and seated position] : examined in the supine and seated position [Asymmetrical] : asymmetrical [No Axillary Lymphadenopathy] : no left axillary lymphadenopathy [No Edema] : no edema [No dominant masses] : no dominant masses in right breast  [No dominant masses] : no dominant masses left breast [Breast Nipple Inversion] : nipples not inverted [Breast Nipple Retraction] : nipples not retracted [Breast Nipple Flattening] : nipples not flattened [Breast Nipple Fissures] : nipples not fissured [de-identified] : ecchymosis and swelling, non-tender to palpation. No tension of incision site, no discharge or erythema present. \par \par Incision C/D/I

## 2022-12-30 NOTE — ASSESSMENT
[FreeTextEntry1] : 73 yo presents for early post-op appointment complaining of right breast and axillary swelling s/p RIGHT lumpectomy and SLNB on 12/22/22 for RIGHT invasive lobular carcinoma % % Her-2 negative, LCIS and focally involving radial scar. Final surgical pathology is still pending. \par \par Diffuse ecchymosis on physical exam, incisions C/D/I. In office US performed today, no fluid appreciated, no indication for aspiration today. \par \par Patient will return on 1/4/22 for reexamination and to discuss surgical pathology results. Patient verbalized understanding and agreement of plan.\par \par \par

## 2022-12-30 NOTE — PROCEDURE
[FreeTextEntry1] : US-guided Bedside R Breast US [FreeTextEntry3] : Technique: a targeted R breast ultrasound was performed with evaluation of the upper outer quadrant, retroareolar region, and axilla.\par US Findings: Small pockets of fluid/hematoma detected. No discrete large fluid collection for aspiration.  \par  [FreeTextEntry2] : Post-operative swelling and ecchymosis

## 2022-12-30 NOTE — PAST MEDICAL HISTORY
[Menarche Age ____] : age at menarche was [unfilled] [Menopause Age____] : age at menopause was [unfilled] [Total Preg ___] : G[unfilled] [Live Births ___] : P[unfilled]  [Ectopics ___] : ectopic pregnancies: [unfilled]  [Age At Live Birth ___] : Age at live birth: [unfilled] [Postmenopausal] : The patient is postmenopausal [History of Hormone Replacement Treatment] : has no history of hormone replacement treatment [FreeTextEntry6] : yes [FreeTextEntry7] : no [FreeTextEntry8] : yes

## 2022-12-30 NOTE — HISTORY OF PRESENT ILLNESS
[FreeTextEntry1] : 73 yo presents for early post-op appointment complaining of right breast and axillary swelling s/p RIGHT lumpectomy and SLNB on 12/22/22 for RIGHT invasive lobular carcinoma % % Her-2 negative, LCIS and focally involving radial scar, seen on diagnostic imaging as architectural distortion at R 11:00 spanning 1.5cm on US, originally palpated by patient. Benign, concordant US-guided biopsy performed same day of L 0.9cm complex cyst with recommendation for 6 month follow-up US. Final surgical pathology is still pending. Denies any fever, chills, redness, pain, or discharge at the incision site.\par \par Patient previously followed by Mesha Araujo NP. She is on blood thinners and has history of post-biopsy hematoma. She was unable to stop blood thinners prior to initial biopsy but stopped for surgery, began again on 12/24/22 after confirming with cardiologist and Dr. Garcia. \par \par Her family history is significant for breast cancer in her mother (dx age 65) and her m-aunt (dx age 50) and b-8-ey-cousin dx recent at age 30; patient met with BRETT Garibay via telehealth 10/28/22, full breast/gyn panel negative for pathogenic mutations. \par \par Patient reports history of diabetes, HLD, HTN, tremor. Of note, patient on blood thinners Prasugrel and baby aspirin as patient had a stent placed in June 2022, instructed by cardiologist to continue for 6 months until December 14, 2022. Patient with allergy to penicillin injections rxn: syncope, tegaderm rxn: blisters, epinephrine, rxn; syncope. \par Patient with partial dentures in top and bottom. \par

## 2022-12-30 NOTE — DATA REVIEWED
[FreeTextEntry1] : 6/24/2020 (R) b/l sMmg & US: heterogeneously dense. No mammographic or ultrasonographic evidence of malignancy. No significant change. BI-RADS 2.\par \par 6/23/21 (R) b/l sMmg & US: Benign findings. No mammographic or ultrasonographic evidence of malignancy. No significant change. BI-RADS 2. \par \par 10/6/22 (R) b/l dx mmg & US: heterogeneously dense. 1. Suspicious palpable architectural distortion at the right 11-12:00 axis which likely correlates with a region of abnormal echotexture spanning 1.5 cm on ultrasound at the 11:00 axis 4 cm from the nipple. Stereotactic biopsy is recommended. In addition clinical evaluation/surgical evaluation is recommended for the palpable finding.\par 2. Suspicious left 8:00 axis 1 cm from nipple 1 cm complex cluster of microcysts which had not been previously visualized. Ultrasound-guided needle biopsy is recommended.\par BI-RADS 4. \par \par 10/20/22 (R/[facility]) right stereo biopsy of architectural distortion at the right 11:00 axis (hourglass clip): Invasive lobular carcinoma, 5 mm in greatest microscopic dimension. Lobular carcinoma in situ, focally involving radial scar. Benign calcifications. Malignant, concordant. \par \par 10/20/22 (R/[facility]) US biopsy left of  0.9 x 0.3 x 0.8 complex cyst at the left 8:00 axis 1cmfn (heart-shaped clip): Cystic apocrine metaplasia. Benign, concordant. FOLLOW-UP: A six-month follow up  ultrasound is recommended. \par \par 11/10/22 B/L MRI (R): Mass in the upper inner right breast likely representing the patient's recently diagnosed invasive lobular carcinoma. Recommend appropriate action. Questionable abnormal lymph node in the axillary tail of the right breast. Recommend targeted right breast ultrasound for further evaluation.\par \par 11/12/22 R Targeted US (R): No abnormal axillary lymph nodes appreciated on ultrasound as was seen on MRI. Correlation with the sentinel node biopsy at the time of lumpectomy, is recommended. A thick-walled 2.0 cm hematoma is seen in the right breast 1:00 -N 3. Interestingly, it has a very large feeding artery and draining vein reminiscent of AV shunting.\par \par 12/15/22 R Lumpectomy and SLNB Pathology: PENDING\par \par

## 2023-01-04 ENCOUNTER — APPOINTMENT (OUTPATIENT)
Dept: BREAST CENTER | Facility: CLINIC | Age: 73
End: 2023-01-04
Payer: MEDICARE

## 2023-01-04 VITALS
OXYGEN SATURATION: 96 % | HEIGHT: 62 IN | BODY MASS INDEX: 30.73 KG/M2 | DIASTOLIC BLOOD PRESSURE: 63 MMHG | HEART RATE: 73 BPM | SYSTOLIC BLOOD PRESSURE: 110 MMHG | WEIGHT: 167 LBS

## 2023-01-04 DIAGNOSIS — Z80.1 FAMILY HISTORY OF MALIGNANT NEOPLASM OF TRACHEA, BRONCHUS AND LUNG: ICD-10-CM

## 2023-01-04 PROCEDURE — 99024 POSTOP FOLLOW-UP VISIT: CPT

## 2023-01-05 NOTE — ASSESSMENT
[FreeTextEntry1] : 71 yo presents for second post-op appointment  s/p RIGHT lumpectomy and SLNB on 12/22/22 for RIGHT invasive lobular carcinoma % % Her-2 negative, LCIS and focally involving radial scar, seen on diagnostic imaging as architectural distortion at R 11:00 spanning 1.5cm on US, originally palpated by patient. Course complicated by right breast and axillary swelling likely hematoma. Final surgical pathology yielded 0.8 cm infiltrating lobular carcinoma no evidence of lymphovascular invasion. Biopsy site changes seen, (0/6) lymph nodes negative for tumor, margins negative. Patient healing well post-operatively and is without complaint, steri-strips reapplied. Oncotype ordered. On anastrozole Dr. Barrientos. Patient will see radiation oncology and RTC in 3 months for reexamination and repeat sozo measurement. Patient verbalized understanding and agreement of plan.\par

## 2023-01-05 NOTE — DATA REVIEWED
[FreeTextEntry1] : 6/24/2020 (R) b/l sMmg & US: heterogeneously dense. No mammographic or ultrasonographic evidence of malignancy. No significant change. BI-RADS 2.\par \par 6/23/21 (R) b/l sMmg & US: Benign findings. No mammographic or ultrasonographic evidence of malignancy. No significant change. BI-RADS 2. \par \par 10/6/22 (R) b/l dx mmg & US: heterogeneously dense. 1. Suspicious palpable architectural distortion at the right 11-12:00 axis which likely correlates with a region of abnormal echotexture spanning 1.5 cm on ultrasound at the 11:00 axis 4 cm from the nipple. Stereotactic biopsy is recommended. In addition clinical evaluation/surgical evaluation is recommended for the palpable finding.\par 2. Suspicious left 8:00 axis 1 cm from nipple 1 cm complex cluster of microcysts which had not been previously visualized. Ultrasound-guided needle biopsy is recommended.\par BI-RADS 4. \par \par 10/20/22 (R/[facility]) right stereo biopsy of architectural distortion at the right 11:00 axis (hourglass clip): Invasive lobular carcinoma, 5 mm in greatest microscopic dimension. Lobular carcinoma in situ, focally involving radial scar. Benign calcifications. Malignant, concordant. \par \par 10/20/22 (R/[facility]) US biopsy left of  0.9 x 0.3 x 0.8 complex cyst at the left 8:00 axis 1cmfn (heart-shaped clip): Cystic apocrine metaplasia. Benign, concordant. FOLLOW-UP: A six-month follow up  ultrasound is recommended. \par \par 11/10/22 B/L MRI (R): Mass in the upper inner right breast likely representing the patient's recently diagnosed invasive lobular carcinoma. Recommend appropriate action. Questionable abnormal lymph node in the axillary tail of the right breast. Recommend targeted right breast ultrasound for further evaluation.\par \par 11/12/22 R Targeted US (R): No abnormal axillary lymph nodes appreciated on ultrasound as was seen on MRI. Correlation with the sentinel node biopsy at the time of lumpectomy, is recommended. A thick-walled 2.0 cm hematoma is seen in the right breast 1:00 -N 3. Interestingly, it has a very large feeding artery and draining vein reminiscent of AV shunting.\par \par 12/15/22 R Lumpectomy and SLNB Pathology: 0.8 cm infiltrating lobular carcinoma no evidence of lymphovascular invasion. Biopsy site changes seen, (0/6) lymph nodes negative for tumor, margins negative. Denies any fever, chills, redness, pain, or discharge at the incision site.\par \par

## 2023-01-05 NOTE — HISTORY OF PRESENT ILLNESS
[FreeTextEntry1] : 73 yo presents for second post-op appointment  s/p RIGHT lumpectomy and SLNB on 12/22/22 for RIGHT invasive lobular carcinoma % % Her-2 negative, LCIS and focally involving radial scar, seen on diagnostic imaging as architectural distortion at R 11:00 spanning 1.5cm on US, originally palpated by patient. Course complicated by right breast and axillary swelling likely hematoma. Final surgical pathology yielded 0.8 cm infiltrating lobular carcinoma no evidence of lymphovascular invasion. Biopsy site changes seen, (0/6) lymph nodes negative for tumor, margins negative. Denies any fever, chills, redness, pain, or discharge at the incision site.\par \par Patient previously followed by Mesha Araujo NP. She is on blood thinners and has history of post-biopsy hematoma. She was unable to stop blood thinners prior to initial biopsy but stopped for surgery, began again on 12/24/22 after confirming with cardiologist and Dr. Garcia. \par \par Her family history is significant for breast cancer in her mother (dx age 65) and her m-aunt (dx age 50) and z-2-hz-cousin dx recent at age 30; patient met with BRETT Garibay via telehealth 10/28/22, full breast/gyn panel negative for pathogenic mutations. Patient currently on Anastrozole with Dr. Barrientos, began post-operatively.\par

## 2023-01-05 NOTE — PAST MEDICAL HISTORY
[Postmenopausal] : The patient is postmenopausal [Menarche Age ____] : age at menarche was [unfilled] [Total Preg ___] : G[unfilled] [Live Births ___] : P[unfilled]  [Ectopics ___] : ectopic pregnancies: [unfilled]  [Age At Live Birth ___] : Age at live birth: [unfilled] [Menopause Age____] : age at menopause was [unfilled] [History of Hormone Replacement Treatment] : has no history of hormone replacement treatment [FreeTextEntry2] : 1 miscarriage [FreeTextEntry6] : yes [FreeTextEntry7] : no [FreeTextEntry8] : yes

## 2023-01-05 NOTE — PHYSICAL EXAM
[Supple] : supple [No Supraclavicular Adenopathy] : no supraclavicular adenopathy [Examined in the supine and seated position] : examined in the supine and seated position [Asymmetrical] : asymmetrical [No dominant masses] : no dominant masses in right breast  [No dominant masses] : no dominant masses left breast [No Axillary Lymphadenopathy] : no left axillary lymphadenopathy [No Edema] : no edema [Breast Nipple Inversion] : nipples not inverted [Breast Nipple Retraction] : nipples not retracted [Breast Nipple Flattening] : nipples not flattened [Breast Nipple Fissures] : nipples not fissured [de-identified] : ecchymosis and swelling, non-tender to palpation. No tension of incision site, no discharge or erythema present. \par \par Incision C/D/I

## 2023-01-31 ENCOUNTER — APPOINTMENT (OUTPATIENT)
Dept: RADIATION ONCOLOGY | Facility: CLINIC | Age: 73
End: 2023-01-31
Payer: MEDICARE

## 2023-01-31 VITALS
WEIGHT: 163 LBS | HEART RATE: 71 BPM | RESPIRATION RATE: 17 BRPM | DIASTOLIC BLOOD PRESSURE: 76 MMHG | SYSTOLIC BLOOD PRESSURE: 123 MMHG | HEIGHT: 62 IN | TEMPERATURE: 97.8 F | BODY MASS INDEX: 30 KG/M2 | OXYGEN SATURATION: 99 %

## 2023-01-31 DIAGNOSIS — E78.5 HYPERLIPIDEMIA, UNSPECIFIED: ICD-10-CM

## 2023-01-31 PROCEDURE — 99203 OFFICE O/P NEW LOW 30 MIN: CPT | Mod: 25

## 2023-01-31 NOTE — REVIEW OF SYSTEMS
[Negative] : Psychiatric [Anxiety: Grade 1 - Mild symptoms; intervention not indicated] : Anxiety: Grade 1 - Mild symptoms; intervention not indicated

## 2023-02-02 NOTE — HISTORY OF PRESENT ILLNESS
[FreeTextEntry1] : Ms. Candy Dhillon is a 72 y.o female pmhx DM,HLD, HTN; famhx breast Ca newly diagnosed  [id number] Right breast invasive lobular carcinoma 0.8cm % % Her-2 negative, LCIS s/p Right breast lumpectomy and SLNB 12/22/22 with Dr. Garcia  referred by Dr. Barrientos for adjuvant RT.  Mass was originally palpated by patient which prompted a b/l breast US and stereotactic biopsy resulting in architectural distortion at the right breast 11:00 axis spanning 1.5cm and left breast 8:00 1cmFN  0.9 x 0.3 x 0.8 cm complex cysyt.  The biopsy pathology resulted as left breast cystic apocrine metaplasia and right breast Lobular carcinoma in situ, focally involving radial scar; post biopsy right breast hematoma. In the following month, November, a MRI showed a  mass measuring 1.7 x 1.0 x 1.6cm  in the upper inner right breast.  12/22/22 the patient underwent a right Magseed localized lumpectomy with lymphatic mapping, and sentinel node axillary dissection of right axilla w/ Dr. Garcia.  Pathology resulted as right breast Infiltrating lobular carcinoma, 0.8 cm, classic type, no evidence of lymphovascular invasion, LCIS, all margins negative for invasive carcinoma, 0/6LN negative.  Patient is currently on Anastrozole 1mg/day since 12/5/22.  \par \par \par \par [facility] test result negative (11/09/22)\par Oncotype 15 (12/22/22)\par Anastrozole 1mg/day since 12/5/22.  \par \par Bra size: 38D\par Age at menarche was  13 y.o\par Age at menopause was  51 y.o\par Prior pregnancies: G6 and P3 .Age at live birth: 33 \par Fertility Treatments: yes\par Birth Control Pill Use: no\par Breast Feeding History:yes\par \par 1/31/2023- New- today the patient presents  generally well. She denies fevers, chills, sob, n/v, diarrhea or constipation. Patient reports no nipple discharge, dimpling, pain or palpable masses in either breast.  Right breast has minor ecchymosis under breast and hematoma is palpable above incision site.   Pt directed to use heat packs per surgical team. Mometasone cream ordered and teaching provided.  Patient verbalized understanding.  She denies alcohol, tobacco or illicit drug use.  She is a  and lives in [place] with her children and .  \par \par 10/20/22 Left breast stereotactic biopsy  guided US ([facility]) \par - 0.9 x 0.3 x 0.8 cm complex cysyt at 8:00 1cmFN\par \par 10/20/22 Right breast stereotactic biopsy guided US \par -architectural distortion at the right 11:00 axis \par -Residual lesion not seen post core biopsy due to hematoma \par \par 10/20/22 b/l breast biopsy patho\par Right Breast: \par -ESTROGEN RECEPTOR: POSITIVE 100%\par -PROGESTERONE RECEPTOR: POSITIVE 100% \par -HER-2: NEGATIVE\par 1  Left 8:00 1cmfn - Cystic apocrine metaplasia.\par 2  Right breast 11:00\par - Invasive lobular carcinoma, 5 mm in greatest microscopic dimension.\par - Lobular carcinoma in situ, focally involving radial scar.\par - Focal atypical duct hyperplasia.\par - Benign calcifications.\par \par 11/10/22 MRI b/l breast ([facility]) \par Right breast: \par -upper inner right breast there is a 1.7 x 1.0 x 1.6cm mass exhibiting predominantly washout kinetics.  Significant biopsy site changes and a hematoma are surrounding the mass and extending inferiorly, posteriorly and laterally.  \par -there is a 8mm structure in the right axillary tail which may represent an abnormal lymph node.  \par Left breast: \par -Lower inner breast: No suspicious enhancement detected, no significant left axillary or internal mammary lymphadenopathy, nipples and skin appear unremarkable.  \par \par 11/22/22 Right breast US ([facility]) \par - no abnormal axillary lymph nodes.  Correlation with the sentinel node biopsy at the time of  lumpectomy is recommended\par -the hematoma measures 2cm  \par \par 12/14/22 Right breast mammo guided needle localization ([facility]) \par - Right 11-12 o clock 7cm magseed loc of the hourglass clip in the right breast \par \par 12/21/22 NM Lymphoscintigraphy \par -Right axillary sentinel lymph node \par \par 12/22/22 Right Magseed localized lumpectomy with lymphatic mapping, sentinel node axillary dissection of right axilla w/ Dr. Garcia \par \par 12/22/22 Surgical pathology \par 1.Right breast, upper outer quadrant, lumpectomy:\par - Infiltrating lobular carcinoma, 0.8 cm, classic type, not seen on\par margins\par - No evidence of lymphovascular invasion\par - Lobular carcinoma in situ, multifocal\par - Sclerosing adenosis, occasionally involved by lobular carcinoma in\par situ, multifocal\par - Fibrocystic changes with usual ductal hyperplasia and numerous\par microcalcifications\par - Biopsy site and healing wound\par \par 2. Right sentinel lymph node I, count 102, excision:\par - Negative for tumor, one lymph node  (0/1)\par \par 3. Right sentinel lymph node II, count 1455, excision:\par - Negative for tumor, one lymph node  (0/1)\par \par 4. Right sentinel lymph node III, count 525, excision:\par - Negative for tumor, one lymph node (0/1)\par \par 5. Right sentinel lymph node IV, count 487, excision:\par - Negative for tumor, one lymph node (0/1)\par \par 6. Right sentinel lymph node V, count 748, excision:\par - Negative for tumor, one lymph node  (o/1)\par \par 7. Right sentinel lymph node VI, count 394, excision:\par - Negative for tumor, one lymph node (0/1)\par \par 8. Medial margin, right breast, excision:\par - Negative for tumor; mammary tissue without significant pathologic\par features\par \par \par 9. Superior margin, right breast, excision:\par - Negative for tumor; mammary tissue without significant pathologic\par features\par \par 10. Lateral margin, right breast, excision:\par - Negative for tumor; mild fibrocystic changes with\par microcalcifications and focal biopsy site changes\par \par 11. Inferior margin, right breast, excision:\par - Negative for tumor; ectatic ducts present\par \par 12. Deep margin, right breast, excision:\par - Negative for tumor; fibrofatty tissue only\par \par 13. Anterior margin, right breast, excision:\par - Negative for tumor; mild fibrocystic changes with\par microcalcifications\par \par Synoptic Summary\par INVASIVE CARCINOMA OF THE BREAST: Resection\par SPECIMEN\par Procedure:  Excision (less than total mastectomy)\par Specimen Laterality:  Right\par TUMOR\par Histologic Type:  Invasive lobular carcinoma\par Glandular (Acinar) / Tubular Differentiation:   Score 3\par Nuclear Pleomorphism:  Score 2\par Mitotic Rate:  Score 1\par Overall Grade:  Grade 2 (scores of 6 or 7)\par Tumor Size: Greatest dimension of largest invasive focus (Millimeters)\par - 8 mm\par Ductal Carcinoma In Situ (DCIS):   Not identified\par Lobular Carcinoma In Situ (LCIS):   Present\par \par Tumor Extent\par Tumor Extent:  Not applicable\par Lymphovascular Invasion:  Not identified\par Treatment Effect in the Breast:   No known presurgical therapy\par Treatment Effect in the Lymph Nodes:   Not applicable\par MARGINS\par Margin Status for Invasive Carcinoma:   All margins negative for invasive\par carcinoma\par Distance from Invasive Carcinoma to Closest Margin: Greater than -     7\par mm\par Margin Status for DCIS:  Not applicable (no DCIS in specimen)\par REGIONAL LYMPH NODES\par Regional Lymph Node Status:  All regional lymph nodes negative for tumor\par Total Number of Lymph Nodes Examined (sentinel and non-sentinel):     6\par Number of [place] Nodes Examined:   6\par DISTANT METASTASIS\par Distant Site(s) Involved: Cannot be determined\par PATHOLOGIC STAGE CLASSIFICATION (pTNM, AJCC 8th Edition)\par Reporting of pT, pN, and (when applicable) pM categories is based\par on information available to the pathologist at the time the report\par is issued. As per the AJCC (Chapter 1, 8th Ed.) it is the managing\par physician's responsibility to establish the final pathologic stage based\par upon all pertinent information, including but potentially not limited to\par this pathology report.\par TNM Descriptors: Not applicable\par pT Category:  pT1b\par Regional Lymph Nodes Modifier:   Not applicable\par pN Category:  pN0\par pM Category:  Not applicable - pM cannot be determined from the\par submitted specimen(s)\par \par Clinical Information\par Right breast cancer lumpectomy with sentinel lymph node dissection and\par margins\par \par

## 2023-02-02 NOTE — PHYSICAL EXAM
[Normal] : normoactive bowel sounds, soft and nontender, no hepatosplenomegaly or masses appreciated [de-identified] : Right breast hematoma over biopsy incision site

## 2023-02-02 NOTE — OB/GYN HISTORY
[Definite:  ___ (Date)] : the last menstrual period was [unfilled] [Currently In Menopause] : currently in menopause [___] : Full Term: [unfilled] [Spotting Between  Menses] : no spotting between menses [History of Birth Control Pills] : Patient has no history of taking birth control pills [History of Hormone Replacement Therapy] : no history of hormone replacement therapy [Experiencing Menopausal Sxs] : not experiencing menopausal symptoms

## 2023-02-06 ENCOUNTER — APPOINTMENT (OUTPATIENT)
Dept: HEMATOLOGY ONCOLOGY | Facility: CLINIC | Age: 73
End: 2023-02-06
Payer: MEDICARE

## 2023-02-06 VITALS
WEIGHT: 163 LBS | HEIGHT: 62 IN | BODY MASS INDEX: 30 KG/M2 | OXYGEN SATURATION: 99 % | SYSTOLIC BLOOD PRESSURE: 123 MMHG | TEMPERATURE: 97.1 F | HEART RATE: 87 BPM | DIASTOLIC BLOOD PRESSURE: 76 MMHG | RESPIRATION RATE: 18 BRPM

## 2023-02-06 PROCEDURE — 99214 OFFICE O/P EST MOD 30 MIN: CPT

## 2023-02-06 NOTE — PAST MEDICAL HISTORY
[History of Hormone Replacement Treatment] : has no history of hormone replacement treatment [FreeTextEntry6] : yes [FreeTextEntry7] : no [FreeTextEntry8] : yes

## 2023-02-06 NOTE — REVIEW OF SYSTEMS
[FreeTextEntry6] : cough with covid, resolved  [FreeTextEntry7] : diarrhea with covid, getting better

## 2023-02-06 NOTE — HISTORY OF PRESENT ILLNESS
[de-identified] : 71yo woman with PMHx including DM, HTN, CAD s/p PCI (with stent placement in June 2022), osteopenia on Prolia (managed by endo, Dr José Luis Beckwith) referred by Dr Garcia for a newly diagnosed right ILC % % Her 2 negative, LCIS and focally involving radial scar, seen on 11/2022 MRI breast as a 1.7cm mass in right upper inner quadrant and an 8mm enhancing structure in right axillary tail w/o fatty hilum, possibly abnormal node, s/p US axilla 11/22/22 without any abnormal lymphadenopathy seen. Here today for f/u with Dr. Barrientos. \par \par Patient currently on Prasugrel and baby aspirin at least until 12/14/2022 due to a stent which was placed June 2022. Patient awaiting clearance from PMD and cardiologist prior to breast surgery. Patient referred by Dr Amadou Garcia for consideration of neoadjuvant antiestrogen therapy prior to surgery.  [de-identified] : Patient presents for follow up. She is compliant with anastrazole. She denies any hot flashes, joint pain or muscle aches. Reports chronic back pain. Denies any chest pain, sob, abdominal pain, n/v/d/c. Last Prolia was on 9/22/22. \par \par She is s/p lump and SLNB in 12/2022, now with post op healing hematoma. Seen by rad onc. [FreeTextEntry1] : 73 yo female presents for newly diagnosed R invasive lobular carcinoma % % Her-2 negative, LCIS and focally involving radial scar, seen on diagnostic imaging as architectural distortion at R 11:00 spanning 1.5cm on US, originally palpated by patient. Benign, concordant US-guided biopsy performed same day of L 0.9cm complex cyst with recommendation for 6 month follow-up US. Patient has not had a breast MRI. \par \par Patient last seen by Mesha Araujo NP in office 10/27/22 with complaints of right pain and skin changes s/p right stereo on 10/20/22, likely due to reaction to adhesive and post-procedure hematoma. \par \par Her family history is significant for breast cancer in her mother (dx age 65) and her m-aunt (dx age 50) and v-2-ac-cousin dx recent at age 30; patient met with BRETT Garibay via telehealth 10/28/22 with plan to proceed with bloodwork for [facility] genetic testing today.\par \par Of note, the patient is on blood thinners, was unable to stop them prior to the biopsy (advised by her cardiologist) \par \par Patient reports history of diabetes, HLD, HTN, tremor. Of note, patient on blood thinners Prasugrel and baby aspirin as patient had a stent placed in June 2022, instructed by cardiologist to continue for 6 months until December 14, 2022. Patient with allergy to penicillin injections rxn: syncope, tegaderm rxn: blisters, epinephrine, rxn; syncope. \par Patient with partial dentures in top and bottom. \par \par Patient denies history of sleep apnea, COPD, loose/missing/infected teeth, issues with anesthesia. \par

## 2023-02-06 NOTE — ASSESSMENT
[FreeTextEntry1] : 73yo woman with PMHx including DM, HTN, CAD s/p PCI (with stent placement in June 2022), osteopenia on Prolia (managed by endo, Dr José Luis Beckwith) referred by Dr Garcia for a newly diagnosed right ILC % % Her 2 negative, LCIS and focally involving radial scar, seen on 11/2022 MRI breast as a 1.7cm mass in right upper inner quadrant and an 8mm enhancing structure in right axillary tail w/o fatty hilum, possibly abnormal node, s/p US axilla 11/22/22 without any abnormal lymphadenopathy seen. \par \par Patient currently on Prasugrel and baby aspirin at least until 12/14/2022 due to a stent which was placed June 2022. Patient awaiting clearance from PMD and cardiologist prior to breast surgery. Patient referred by Dr Amadou Garcia for consideration of neoadjuvant antiestrogen therapy prior to surgery. \par \par Pt s/p visit with genetic counselor Lani Lua, [facility] test result negative 11/2022. \par \par \par Reviewed general management of early stage hormone receptor positive breast cancer that is isolated to the breast with patient and her daughter in law who was present at initial visit on 12/5/22. I reviewed that surgery is the only curative modality and an integral part of her breast cancer treatment; once medically optimized and cleared for surgery (by PMD, cardiologist) she should undergo surgery with Dr Amadou Garcia. During the process of continuing dual antiplatelet therapy and being medically optimized for surgery, I recommended she undergo neoadjuvant endocrine therapy with Arimidex 1mg/day with periodic physical exams to assess response to therapy. I reviewed goal of antiestrogen therapy before surgery will be to shrink the mass to achieve a better surgical outcome and to give her time with more of a piece of mind to prepare for the surgery/complete dual antiplatelet therapy. I reviewed the potential side effects of Arimidex including but not limited to hot flashes, body aches and pains, decrease in bone density and worsened lipid profile. \par \par I asked patient to take Arimidex 1mg/day with vitamin D/Ca as well (which patient reported she is already on due to Prolia use). \par \par 2/6/23 Visit: s/p lump and slnb on 12/20/22, oncotype 15, pT1b pN0 ER/SD+Her2- ILC\par reviewed result of pathology, and recommended adjuvant arimidex 1mg/day for five years total with regular follow up exams, monitoring of bone density every two years and vitamin d/calcium daily for now. \par I asked her to continue regular surveillance breast imaging with Dr Garcia and to continue to follow up with him as well regularly. \par \par \par RTC in 4 months for follow up.\par \par

## 2023-03-03 ENCOUNTER — NON-APPOINTMENT (OUTPATIENT)
Age: 73
End: 2023-03-03

## 2023-03-20 ENCOUNTER — NON-APPOINTMENT (OUTPATIENT)
Age: 73
End: 2023-03-20

## 2023-03-23 NOTE — HISTORY OF PRESENT ILLNESS
[FreeTextEntry1] : Ms. Candy Dhillon is a 72 y.o female pmhx DM,HLD, HTN; famhx breast Ca newly diagnosed  [id number] Right breast invasive lobular carcinoma 0.8cm % % Her-2 negative, LCIS s/p Right breast lumpectomy and SLNB 12/22/22 with Dr. Garcia  referred by Dr. Barrientos for adjuvant RT.  Mass was originally palpated by patient which prompted a b/l breast US and stereotactic biopsy resulting in architectural distortion at the right breast 11:00 axis spanning 1.5cm and left breast 8:00 1cmFN  0.9 x 0.3 x 0.8 cm complex cysyt.  The biopsy pathology resulted as left breast cystic apocrine metaplasia and right breast Lobular carcinoma in situ, focally involving radial scar; post biopsy right breast hematoma. In the following month, November, a MRI showed a  mass measuring 1.7 x 1.0 x 1.6cm  in the upper inner right breast.  12/22/22 the patient underwent a right Magseed localized lumpectomy with lymphatic mapping, and sentinel node axillary dissection of right axilla w/ Dr. Garcia.  Pathology resulted as right breast Infiltrating lobular carcinoma, 0.8 cm, classic type, no evidence of lymphovascular invasion, LCIS, all margins negative for invasive carcinoma, 0/6LN negative.  Patient is currently on Anastrozole 1mg/day since 12/5/22.  \par \par \par \par [facility] test result negative (11/09/22)\par Oncotype 15 (12/22/22)\par Anastrozole 1mg/day since 12/5/22.  \par \par Bra size: 38D\par Age at menarche was  13 y.o\par Age at menopause was  51 y.o\par Prior pregnancies: G6 and P3 .Age at live birth: 33 \par Fertility Treatments: yes\par Birth Control Pill Use: no\par Breast Feeding History:yes\par \par 3/20/23-FOTV: 2600cGy/2600cGy to Right breast. Today patient presents generally well. Denies any nipple tenderness, discharges, dimpling, n.v, chest pain, headache, fatigue. Patient says her right breast is tender to touch. Explained the possible side effects to patient, patient verbalized understanding. She has been following her skin regimen daily, expressed the importance of following skin regimen 2 weeks post RT. Pt verbalized understanding. \par  \par \par 1/31/2023- New- today the patient presents  generally well. She denies fevers, chills, sob, n/v, diarrhea or constipation. Patient reports no nipple discharge, dimpling, pain or palpable masses in either breast.  Right breast has minor ecchymosis under breast and hematoma is palpable above incision site.   Pt directed to use heat packs per surgical team. Mometasone cream ordered and teaching provided.  Patient verbalized understanding.  She denies alcohol, tobacco or illicit drug use.  She is a  and lives in [place] with her children and .  \par \par 10/20/22 Left breast stereotactic biopsy  guided US ([facility]) \par - 0.9 x 0.3 x 0.8 cm complex cysyt at 8:00 1cmFN\par \par 10/20/22 Right breast stereotactic biopsy guided US \par -architectural distortion at the right 11:00 axis \par -Residual lesion not seen post core biopsy due to hematoma \par \par 10/20/22 b/l breast biopsy patho\par Right Breast: \par -ESTROGEN RECEPTOR: POSITIVE 100%\par -PROGESTERONE RECEPTOR: POSITIVE 100% \par -HER-2: NEGATIVE\par 1  Left 8:00 1cmfn - Cystic apocrine metaplasia.\par 2  Right breast 11:00\par - Invasive lobular carcinoma, 5 mm in greatest microscopic dimension.\par - Lobular carcinoma in situ, focally involving radial scar.\par - Focal atypical duct hyperplasia.\par - Benign calcifications.\par \par 11/10/22 MRI b/l breast ([facility]) \par Right breast: \par -upper inner right breast there is a 1.7 x 1.0 x 1.6cm mass exhibiting predominantly washout kinetics.  Significant biopsy site changes and a hematoma are surrounding the mass and extending inferiorly, posteriorly and laterally.  \par -there is a 8mm structure in the right axillary tail which may represent an abnormal lymph node.  \par Left breast: \par -Lower inner breast: No suspicious enhancement detected, no significant left axillary or internal mammary lymphadenopathy, nipples and skin appear unremarkable.  \par \par 11/22/22 Right breast US ([facility]) \par - no abnormal axillary lymph nodes.  Correlation with the sentinel node biopsy at the time of  lumpectomy is recommended\par -the hematoma measures 2cm  \par \par 12/14/22 Right breast mammo guided needle localization ([facility]) \par - Right 11-12 o clock 7cm magseed loc of the hourglass clip in the right breast \par \par 12/21/22 NM Lymphoscintigraphy \par -Right axillary sentinel lymph node \par \par 12/22/22 Right Magseed localized lumpectomy with lymphatic mapping, sentinel node axillary dissection of right axilla w/ Dr. Garica \par \par 12/22/22 Surgical pathology \par 1.Right breast, upper outer quadrant, lumpectomy:\par - Infiltrating lobular carcinoma, 0.8 cm, classic type, not seen on\par margins\par - No evidence of lymphovascular invasion\par - Lobular carcinoma in situ, multifocal\par - Sclerosing adenosis, occasionally involved by lobular carcinoma in\par situ, multifocal\par - Fibrocystic changes with usual ductal hyperplasia and numerous\par microcalcifications\par - Biopsy site and healing wound\par \par 2. Right sentinel lymph node I, count 102, excision:\par - Negative for tumor, one lymph node  (0/1)\par \par 3. Right sentinel lymph node II, count 1455, excision:\par - Negative for tumor, one lymph node  (0/1)\par \par 4. Right sentinel lymph node III, count 525, excision:\par - Negative for tumor, one lymph node (0/1)\par \par 5. Right sentinel lymph node IV, count 487, excision:\par - Negative for tumor, one lymph node (0/1)\par \par 6. Right sentinel lymph node V, count 748, excision:\par - Negative for tumor, one lymph node  (o/1)\par \par 7. Right sentinel lymph node VI, count 394, excision:\par - Negative for tumor, one lymph node (0/1)\par \par 8. Medial margin, right breast, excision:\par - Negative for tumor; mammary tissue without significant pathologic\par features\par \par \par 9. Superior margin, right breast, excision:\par - Negative for tumor; mammary tissue without significant pathologic\par features\par \par 10. Lateral margin, right breast, excision:\par - Negative for tumor; mild fibrocystic changes with\par microcalcifications and focal biopsy site changes\par \par 11. Inferior margin, right breast, excision:\par - Negative for tumor; ectatic ducts present\par \par 12. Deep margin, right breast, excision:\par - Negative for tumor; fibrofatty tissue only\par \par 13. Anterior margin, right breast, excision:\par - Negative for tumor; mild fibrocystic changes with\par microcalcifications\par \par Synoptic Summary\par INVASIVE CARCINOMA OF THE BREAST: Resection\par SPECIMEN\par Procedure:  Excision (less than total mastectomy)\par Specimen Laterality:  Right\par TUMOR\par Histologic Type:  Invasive lobular carcinoma\par Glandular (Acinar) / Tubular Differentiation:   Score 3\par Nuclear Pleomorphism:  Score 2\par Mitotic Rate:  Score 1\par Overall Grade:  Grade 2 (scores of 6 or 7)\par Tumor Size: Greatest dimension of largest invasive focus (Millimeters)\par - 8 mm\par Ductal Carcinoma In Situ (DCIS):   Not identified\par Lobular Carcinoma In Situ (LCIS):   Present\par \par Tumor Extent\par Tumor Extent:  Not applicable\par Lymphovascular Invasion:  Not identified\par Treatment Effect in the Breast:   No known presurgical therapy\par Treatment Effect in the Lymph Nodes:   Not applicable\par MARGINS\par Margin Status for Invasive Carcinoma:   All margins negative for invasive\par carcinoma\par Distance from Invasive Carcinoma to Closest Margin: Greater than -     7\par mm\par Margin Status for DCIS:  Not applicable (no DCIS in specimen)\par REGIONAL LYMPH NODES\par Regional Lymph Node Status:  All regional lymph nodes negative for tumor\par Total Number of Lymph Nodes Examined (sentinel and non-sentinel):     6\par Number of [place] Nodes Examined:   6\par DISTANT METASTASIS\par Distant Site(s) Involved: Cannot be determined\par PATHOLOGIC STAGE CLASSIFICATION (pTNM, AJCC 8th Edition)\par Reporting of pT, pN, and (when applicable) pM categories is based\par on information available to the pathologist at the time the report\par is issued. As per the AJCC (Chapter 1, 8th Ed.) it is the managing\par physician's responsibility to establish the final pathologic stage based\par upon all pertinent information, including but potentially not limited to\par this pathology report.\par TNM Descriptors: Not applicable\par pT Category:  pT1b\par Regional Lymph Nodes Modifier:   Not applicable\par pN Category:  pN0\par pM Category:  Not applicable - pM cannot be determined from the\par submitted specimen(s)\par \par Clinical Information\par Right breast cancer lumpectomy with sentinel lymph node dissection and\par margins\par \par

## 2023-03-23 NOTE — OB/GYN HISTORY
[Definite:  ___ (Date)] : the last menstrual period was [unfilled] [Spotting Between  Menses] : no spotting between menses [History of Birth Control Pills] : Patient has no history of taking birth control pills [History of Hormone Replacement Therapy] : no history of hormone replacement therapy [Currently In Menopause] : currently in menopause [Experiencing Menopausal Sxs] : not experiencing menopausal symptoms [___] : Full Term: [unfilled]

## 2023-03-23 NOTE — PHYSICAL EXAM
[Normal] : normoactive bowel sounds, soft and nontender, no hepatosplenomegaly or masses appreciated [de-identified] : Right breast hematoma over biopsy incision site

## 2023-03-23 NOTE — REVIEW OF SYSTEMS
[Anxiety: Grade 1 - Mild symptoms; intervention not indicated] : Anxiety: Grade 1 - Mild symptoms; intervention not indicated [Negative] : Psychiatric

## 2023-03-28 ENCOUNTER — NON-APPOINTMENT (OUTPATIENT)
Age: 73
End: 2023-03-28

## 2023-03-28 NOTE — DISCUSSION/SUMMARY
[FreeTextEntry1] : 3/28/23:\par We have been unable to contact patient regarding genetic testing reflex results after multiple attempts. Final results were negative for any genetic variants on [facility] breast and gynecological cancers panel. Copy of report will be scanned into chart and referring provider was notified. We remain available to discuss these results with patient at her earliest convenience.\par \par -Lani Lua MS, CGC

## 2023-03-29 ENCOUNTER — APPOINTMENT (OUTPATIENT)
Dept: BREAST CENTER | Facility: CLINIC | Age: 73
End: 2023-03-29
Payer: MEDICARE

## 2023-03-29 VITALS
SYSTOLIC BLOOD PRESSURE: 129 MMHG | HEART RATE: 80 BPM | HEIGHT: 62 IN | WEIGHT: 161 LBS | OXYGEN SATURATION: 97 % | BODY MASS INDEX: 29.63 KG/M2 | DIASTOLIC BLOOD PRESSURE: 77 MMHG

## 2023-03-29 DIAGNOSIS — Z00.00 ENCOUNTER FOR GENERAL ADULT MEDICAL EXAMINATION W/OUT ABNORMAL FINDINGS: ICD-10-CM

## 2023-03-29 PROCEDURE — 93702 BIS XTRACELL FLUID ANALYSIS: CPT

## 2023-03-29 PROCEDURE — 99213 OFFICE O/P EST LOW 20 MIN: CPT

## 2023-03-31 NOTE — DATA REVIEWED
[FreeTextEntry1] : 6/24/2020 (R) b/l sMmg & US: heterogeneously dense. No mammographic or ultrasonographic evidence of malignancy. No significant change. BI-RADS 2.\par \par 6/23/21 (R) b/l sMmg & US: Benign findings. No mammographic or ultrasonographic evidence of malignancy. No significant change. BI-RADS 2. \par \par 10/6/22 (R) b/l dx mmg & US: heterogeneously dense. 1. Suspicious palpable architectural distortion at the right 11-12:00 axis which likely correlates with a region of abnormal echotexture spanning 1.5 cm on ultrasound at the 11:00 axis 4 cm from the nipple. Stereotactic biopsy is recommended. In addition clinical evaluation/surgical evaluation is recommended for the palpable finding.\par 2. Suspicious left 8:00 axis 1 cm from nipple 1 cm complex cluster of microcysts which had not been previously visualized. Ultrasound-guided needle biopsy is recommended.\par BI-RADS 4. \par \par 10/20/22 (R/[facility]) right stereo biopsy of architectural distortion at the right 11:00 axis (hourglass clip): Invasive lobular carcinoma, 5 mm in greatest microscopic dimension. Lobular carcinoma in situ, focally involving radial scar. Benign calcifications. Malignant, concordant. \par \par 10/20/22 (R/[facility]) US biopsy left of  0.9 x 0.3 x 0.8 complex cyst at the left 8:00 axis 1cmfn (heart-shaped clip): Cystic apocrine metaplasia. Benign, concordant. FOLLOW-UP: A six-month follow up  ultrasound is recommended. \par \par 11/10/22 B/L MRI (R): Mass in the upper inner right breast likely representing the patient's recently diagnosed invasive lobular carcinoma. Recommend appropriate action. Questionable abnormal lymph node in the axillary tail of the right breast. Recommend targeted right breast ultrasound for further evaluation.\par \par 11/12/22 R Targeted US (R): No abnormal axillary lymph nodes appreciated on ultrasound as was seen on MRI. Correlation with the sentinel node biopsy at the time of lumpectomy, is recommended. A thick-walled 2.0 cm hematoma is seen in the right breast 1:00 -N 3. Interestingly, it has a very large feeding artery and draining vein reminiscent of AV shunting.\par \par 12/15/22 R Lumpectomy and SLNB Pathology: 0.8 cm infiltrating lobular carcinoma no evidence of lymphovascular invasion. Biopsy site changes seen, (0/6) lymph nodes negative for tumor, margins negative. Denies any fever, chills, redness, pain, or discharge at the incision site.\par \par

## 2023-03-31 NOTE — PAST MEDICAL HISTORY
[Postmenopausal] : The patient is postmenopausal [Menarche Age ____] : age at menarche was [unfilled] [Menopause Age____] : age at menopause was [unfilled] [Total Preg ___] : G[unfilled] [Live Births ___] : P[unfilled]  [Ectopics ___] : ectopic pregnancies: [unfilled]  [Age At Live Birth ___] : Age at live birth: [unfilled] [History of Hormone Replacement Treatment] : has no history of hormone replacement treatment [FreeTextEntry2] : 1 miscarriage [FreeTextEntry6] : yes [FreeTextEntry7] : no [FreeTextEntry8] : yes

## 2023-03-31 NOTE — ASSESSMENT
[FreeTextEntry1] : 73 yo presents for 3 month follow up s/p RIGHT lumpectomy and SLNB on 12/22/22 for RIGHT invasive lobular carcinoma % % Her-2 negative, LCIS and focally involving radial scar, seen on diagnostic imaging as architectural distortion at R 11:00 spanning 1.5cm on US, originally palpated by patient. Course complicated by right breast and axillary swelling likely hematoma. Final surgical pathology yielded 0.8 cm infiltrating lobular carcinoma no evidence of lymphovascular invasion. Biopsy site changes seen, (0/6) lymph nodes negative for tumor, margins negative. \par \par Oncotype RS 15. S/p XRT, completed 1 week ago. Patient without complaint. Physical exam WNL. Sozo measurement obtained in office today, WNL. Plan for B/L DX MMG/US, re-examination and repeat sozo measurement in Oct 2023. Patient verbalized understanding and agreement of plan.

## 2023-03-31 NOTE — HISTORY OF PRESENT ILLNESS
[FreeTextEntry1] : 73 yo presents for 3 month follow up s/p RIGHT lumpectomy and SLNB on 12/22/22 for RIGHT invasive lobular carcinoma % % Her-2 negative, LCIS and focally involving radial scar, seen on diagnostic imaging as architectural distortion at R 11:00 spanning 1.5cm on US, originally palpated by patient. Course complicated by right breast and axillary swelling likely hematoma. Final surgical pathology yielded 0.8 cm infiltrating lobular carcinoma no evidence of lymphovascular invasion. Biopsy site changes seen, (0/6) lymph nodes negative for tumor, margins negative. \par \par Oncotype RS 15. Completed XRT Dr. Wernicke 3/22/23, on Anastrozole Dr. Barrientos.\par \par Patient previously followed by Mesha Araujo NP. She is on blood thinners and has history of post-biopsy hematoma. She was unable to stop blood thinners prior to initial biopsy but stopped for surgery, began again on 12/24/22 after confirming with cardiologist and Dr. Garcia. \par \par Her family history is significant for breast cancer in her mother (dx age 65) and her m-aunt (dx age 50) and [id number] dx recent at age 30; patient met with BRETT Garibay via telehealth 10/28/22, full breast/gyn panel negative for pathogenic mutations. \par \par TNM Stage: p T 1b N 0 M x \par AJCC Stage (8th Ed): I. \par

## 2023-03-31 NOTE — PHYSICAL EXAM
[Supple] : supple [No Supraclavicular Adenopathy] : no supraclavicular adenopathy [Examined in the supine and seated position] : examined in the supine and seated position [Asymmetrical] : asymmetrical [No dominant masses] : no dominant masses in right breast  [No dominant masses] : no dominant masses left breast [No Axillary Lymphadenopathy] : no left axillary lymphadenopathy [No Edema] : no edema [Breast Nipple Inversion] : nipples not inverted [Breast Nipple Retraction] : nipples not retracted [Breast Nipple Flattening] : nipples not flattened [Breast Nipple Fissures] : nipples not fissured [de-identified] : well-healed incision

## 2023-04-17 ENCOUNTER — NON-APPOINTMENT (OUTPATIENT)
Age: 73
End: 2023-04-17

## 2023-05-18 ENCOUNTER — APPOINTMENT (OUTPATIENT)
Dept: RADIATION ONCOLOGY | Facility: CLINIC | Age: 73
End: 2023-05-18

## 2023-06-05 ENCOUNTER — APPOINTMENT (OUTPATIENT)
Dept: RADIATION ONCOLOGY | Facility: CLINIC | Age: 73
End: 2023-06-05
Payer: MEDICARE

## 2023-06-05 VITALS
BODY MASS INDEX: 30 KG/M2 | SYSTOLIC BLOOD PRESSURE: 122 MMHG | RESPIRATION RATE: 17 BRPM | OXYGEN SATURATION: 97 % | HEIGHT: 62 IN | HEART RATE: 76 BPM | DIASTOLIC BLOOD PRESSURE: 76 MMHG | WEIGHT: 163 LBS | TEMPERATURE: 97.2 F

## 2023-06-05 PROCEDURE — 99024 POSTOP FOLLOW-UP VISIT: CPT

## 2023-06-05 NOTE — REVIEW OF SYSTEMS
[Fatigue: Grade 0] : Fatigue: Grade 0 [Breast Pain: Grade 0] : Breast Pain: Grade 0 [Dizziness: Grade 0] : Dizziness: Grade 0  [Headache: Grade 0] : Headache: Grade 0 [Lethargy: Grade 0] : Lethargy: Grade 0 [Anxiety: Grade 0] : Anxiety: Grade 0  [Dyspnea: Grade 0] : Dyspnea: Grade 0 [Hypoxia: Grade 0] : Hypoxia: Grade 0 [Pruritus: Grade 0] : Pruritus: Grade 0 [Skin Atrophy: Grade 0] : Skin Atrophy: Grade 0 [Skin Hyperpigmentation: Grade 0] : Skin Hyperpigmentation: Grade 0 [Skin Induration: Grade 0] : Skin Induration: Grade 0 [Dermatitis Radiation: Grade 0] : Dermatitis Radiation: Grade 0

## 2023-06-06 NOTE — HISTORY OF PRESENT ILLNESS
[FreeTextEntry1] : Ms. Candy Dhillon is a 73 y.o female pmhx DM,HLD, HTN; famhx breast Ca newly diagnosed  [id number] Right breast invasive lobular carcinoma 0.8cm % % Her-2 negative, LCIS s/p Right breast lumpectomy and SLNB 12/22/22 with Dr. Garcia  referred by Dr. Barrientos for adjuvant RT.  Mass was originally palpated by patient which prompted a b/l breast US and stereotactic biopsy resulting in architectural distortion at the right breast 11:00 axis spanning 1.5cm and left breast 8:00 1cmFN  0.9 x 0.3 x 0.8 cm complex cysyt.  The biopsy pathology resulted as left breast cystic apocrine metaplasia and right breast Lobular carcinoma in situ, focally involving radial scar; post biopsy right breast hematoma. In the following month, November, a MRI showed a  mass measuring 1.7 x 1.0 x 1.6cm  in the upper inner right breast.  12/22/22 the patient underwent a right Magseed localized lumpectomy with lymphatic mapping, and sentinel node axillary dissection of right axilla w/ Dr. Garcia.  Pathology resulted as right breast Infiltrating lobular carcinoma, 0.8 cm, classic type, no evidence of lymphovascular invasion, LCIS, all margins negative for invasive carcinoma, 0/6LN negative.  Patient is currently on Anastrozole 1mg/day since 12/5/22.  \par \par \par \par [employer] test result negative (11/09/22)\par Oncotype 15 (12/22/22)\par Anastrozole 1mg/day since 12/5/22.  \par \par Bra size: 38D\par Age at menarche was  13 y.o\par Age at menopause was  51 y.o\par Prior pregnancies: G6 and P3 .Age at live birth: 33 \par Fertility Treatments: yes\par Birth Control Pill Use: no\par Breast Feeding History:yes\par \par 1/31/2023- New- today the patient presents  generally well. She denies fevers, chills, sob, n/v, diarrhea or constipation. Patient reports no nipple discharge, dimpling, pain or palpable masses in either breast.  Right breast has minor ecchymosis under breast and hematoma is palpable above incision site.   Pt directed to use heat packs per surgical team. Mometasone cream ordered and teaching provided.  Patient verbalized understanding.  She denies alcohol, tobacco or illicit drug use.  She is a  and lives in [place] with her children and .  \par \par \par 6/4/2023- PTE: Ms. Dhillon completed 5 fractions of radiation therapy from 3/14-3/20/2023 [id number] to the right breast. She presents today for post treatment evaluation. She continues to follow with Dr. Garcia with plan for mammogra/ultrasound with sozo measurement and exam  in 10/2023. Patient is still on anastrozole. Denies any sob, chest pain, dizziness, headache, fever, chills, hyperpigmentation. Skin is warm, non-tender to touch. No hyperpigmentation noted to b/l breast. \par \par \par ------------------------------------------------------------------------------------------------------------------------\par 10/20/22 Left breast stereotactic biopsy  guided US ([facility]) \par - 0.9 x 0.3 x 0.8 cm complex cysyt at 8:00 1cmFN\par \par 10/20/22 Right breast stereotactic biopsy guided US \par -architectural distortion at the right 11:00 axis \par -Residual lesion not seen post core biopsy due to hematoma \par \par 10/20/22 b/l breast biopsy patho\par Right Breast: \par -ESTROGEN RECEPTOR: POSITIVE 100%\par -PROGESTERONE RECEPTOR: POSITIVE 100% \par -HER-2: NEGATIVE\par 1  Left 8:00 1cmfn - Cystic apocrine metaplasia.\par 2  Right breast 11:00\par - Invasive lobular carcinoma, 5 mm in greatest microscopic dimension.\par - Lobular carcinoma in situ, focally involving radial scar.\par - Focal atypical duct hyperplasia.\par - Benign calcifications.\par \par 11/10/22 MRI b/l breast ([facility]) \par Right breast: \par -upper inner right breast there is a 1.7 x 1.0 x 1.6cm mass exhibiting predominantly washout kinetics.  Significant biopsy site changes and a hematoma are surrounding the mass and extending inferiorly, posteriorly and laterally.  \par -there is a 8mm structure in the right axillary tail which may represent an abnormal lymph node.  \par Left breast: \par -Lower inner breast: No suspicious enhancement detected, no significant left axillary or internal mammary lymphadenopathy, nipples and skin appear unremarkable.  \par \par 11/22/22 Right breast US ([facility]) \par - no abnormal axillary lymph nodes.  Correlation with the sentinel node biopsy at the time of  lumpectomy is recommended\par -the hematoma measures 2cm  \par \par 12/14/22 Right breast mammo guided needle localization ([facility]) \par - Right 11-12 o clock 7cm magseed loc of the hourglass clip in the right breast \par \par 12/21/22 NM Lymphoscintigraphy \par -Right axillary sentinel lymph node \par \par 12/22/22 Right Magseed localized lumpectomy with lymphatic mapping, sentinel node axillary dissection of right axilla w/ Dr. Garcia \par \par 12/22/22 Surgical pathology \par 1.Right breast, upper outer quadrant, lumpectomy:\par - Infiltrating lobular carcinoma, 0.8 cm, classic type, not seen on\par margins\par - No evidence of lymphovascular invasion\par - Lobular carcinoma in situ, multifocal\par - Sclerosing adenosis, occasionally involved by lobular carcinoma in\par situ, multifocal\par - Fibrocystic changes with usual ductal hyperplasia and numerous\par microcalcifications\par - Biopsy site and healing wound\par \par 2. Right sentinel lymph node I, count 102, excision:\par - Negative for tumor, one lymph node  (0/1)\par \par 3. Right sentinel lymph node II, count 1455, excision:\par - Negative for tumor, one lymph node  (0/1)\par \par 4. Right sentinel lymph node III, count 525, excision:\par - Negative for tumor, one lymph node (0/1)\par \par 5. Right sentinel lymph node IV, count 487, excision:\par - Negative for tumor, one lymph node (0/1)\par \par 6. Right sentinel lymph node V, count 748, excision:\par - Negative for tumor, one lymph node  (o/1)\par \par 7. Right sentinel lymph node VI, count 394, excision:\par - Negative for tumor, one lymph node (0/1)\par \par 8. Medial margin, right breast, excision:\par - Negative for tumor; mammary tissue without significant pathologic\par features\par \par \par 9. Superior margin, right breast, excision:\par - Negative for tumor; mammary tissue without significant pathologic\par features\par \par 10. Lateral margin, right breast, excision:\par - Negative for tumor; mild fibrocystic changes with\par microcalcifications and focal biopsy site changes\par \par 11. Inferior margin, right breast, excision:\par - Negative for tumor; ectatic ducts present\par \par 12. Deep margin, right breast, excision:\par - Negative for tumor; fibrofatty tissue only\par \par 13. Anterior margin, right breast, excision:\par - Negative for tumor; mild fibrocystic changes with\par microcalcifications\par \par Synoptic Summary\par INVASIVE CARCINOMA OF THE BREAST: Resection\par SPECIMEN\par Procedure:  Excision (less than total mastectomy)\par Specimen Laterality:  Right\par TUMOR\par Histologic Type:  Invasive lobular carcinoma\par Glandular (Acinar) / Tubular Differentiation:   Score 3\par Nuclear Pleomorphism:  Score 2\par Mitotic Rate:  Score 1\par Overall Grade:  Grade 2 (scores of 6 or 7)\par Tumor Size: Greatest dimension of largest invasive focus (Millimeters)\par - 8 mm\par Ductal Carcinoma In Situ (DCIS):   Not identified\par Lobular Carcinoma In Situ (LCIS):   Present\par \par Tumor Extent\par Tumor Extent:  Not applicable\par Lymphovascular Invasion:  Not identified\par Treatment Effect in the Breast:   No known presurgical therapy\par Treatment Effect in the Lymph Nodes:   Not applicable\par MARGINS\par Margin Status for Invasive Carcinoma:   All margins negative for invasive\par carcinoma\par Distance from Invasive Carcinoma to Closest Margin: Greater than -     7\par mm\par Margin Status for DCIS:  Not applicable (no DCIS in specimen)\par REGIONAL LYMPH NODES\par Regional Lymph Node Status:  All regional lymph nodes negative for tumor\par Total Number of Lymph Nodes Examined (sentinel and non-sentinel):     6\par Number of [place] Nodes Examined:   6\par DISTANT METASTASIS\par Distant Site(s) Involved: Cannot be determined\par PATHOLOGIC STAGE CLASSIFICATION (pTNM, AJCC 8th Edition)\par Reporting of pT, pN, and (when applicable) pM categories is based\par on information available to the pathologist at the time the report\par is issued. As per the AJCC (Chapter 1, 8th Ed.) it is the managing\par physician's responsibility to establish the final pathologic stage based\par upon all pertinent information, including but potentially not limited to\par this pathology report.\par TNM Descriptors: Not applicable\par pT Category:  pT1b\par Regional Lymph Nodes Modifier:   Not applicable\par pN Category:  pN0\par pM Category:  Not applicable - pM cannot be determined from the\par submitted specimen(s)\par \par Clinical Information\par Right breast cancer lumpectomy with sentinel lymph node dissection and\par margins\par \par

## 2023-06-12 ENCOUNTER — APPOINTMENT (OUTPATIENT)
Dept: HEMATOLOGY ONCOLOGY | Facility: CLINIC | Age: 73
End: 2023-06-12
Payer: MEDICARE

## 2023-06-12 VITALS
OXYGEN SATURATION: 98 % | TEMPERATURE: 98.1 F | SYSTOLIC BLOOD PRESSURE: 114 MMHG | BODY MASS INDEX: 29.81 KG/M2 | WEIGHT: 162 LBS | DIASTOLIC BLOOD PRESSURE: 68 MMHG | RESPIRATION RATE: 18 BRPM | HEART RATE: 67 BPM | HEIGHT: 62 IN

## 2023-06-12 PROCEDURE — 99214 OFFICE O/P EST MOD 30 MIN: CPT

## 2023-06-12 RX ORDER — MOMETASONE FUROATE 1 MG/G
0.1 OINTMENT TOPICAL TWICE DAILY
Qty: 1 | Refills: 4 | Status: DISCONTINUED | COMMUNITY
Start: 2023-01-31 | End: 2023-06-12

## 2023-06-12 NOTE — HISTORY OF PRESENT ILLNESS
[de-identified] : 72 yo woman with PMHx including DM, HTN, CAD s/p PCI (with stent placement in June 2022), osteopenia on Prolia (managed by endo, Dr José Luis Beckwith) referred by Dr Garcia for a newly diagnosed right ILC % % Her 2 negative, LCIS and focally involving radial scar, seen on 11/2022 MRI breast as a 1.7cm mass in right upper inner quadrant and an 8mm enhancing structure in right axillary tail w/o fatty hilum, possibly abnormal node, s/p US axilla 11/22/22 without any abnormal lymphadenopathy seen. \par \par Here today for f/u with Dr. Barrientos. \par \par Patient currently on Prasugrel and baby aspirin at least until 12/14/2022 due to a stent which was placed June 2022. Patient awaiting clearance from PMD and cardiologist prior to breast surgery. Patient referred by Dr Amadou Garcia for consideration of neoadjuvant antiestrogen therapy prior to surgery.  [de-identified] : Patient presents for follow up. She is compliant with anastrazole. She denies any hot flashes, joint pain or muscle aches. Reports chronic back pain. Denies any chest pain, sob, abdominal pain, n/v/d/c. Last Prolia was a week ago. Finished RT about 2 months ago.  [FreeTextEntry1] : 72 yo female presents for newly diagnosed R invasive lobular carcinoma % % Her-2 negative, LCIS and focally involving radial scar, seen on diagnostic imaging as architectural distortion at R 11:00 spanning 1.5cm on US, originally palpated by patient. Benign, concordant US-guided biopsy performed same day of L 0.9cm complex cyst with recommendation for 6 month follow-up US. Patient has not had a breast MRI. \par \par \par Patient reports history of diabetes, HLD, HTN, tremor. Of note, patient on blood thinners Prasugrel and baby aspirin as patient had a stent placed in June 2022, instructed by cardiologist to continue for 6 months until December 14, 2022. \par \par \par

## 2023-06-12 NOTE — PHYSICAL EXAM
[Ambulatory and capable of all self care but unable to carry out any work activities] : Status 2- Ambulatory and capable of all self care but unable to carry out any work activities. Up and about more than 50% of waking hours [Normal] : affect appropriate [de-identified] : right breast with well healed surgical incision, some right breast RT changes, no hard lesions in either breast and axilla

## 2023-06-12 NOTE — REASON FOR VISIT
[Follow-Up Visit] : a follow-up [FreeTextEntry2] : Early stage breast cancer  [FreeTextEntry1] : newly diagnosed R ILC

## 2023-06-12 NOTE — ASSESSMENT
[FreeTextEntry1] : 72 yo woman with PMHx including DM, HTN, CAD s/p PCI (with stent placement in June 2022), osteopenia on Prolia (managed by endo, Dr José Luis Beckwith, last administered 6/2023) referred by Dr Garcia for a right ILC % % Her 2 negative, LCIS and focally involving radial scar, seen on 11/2022 MRI breast as a 1.7cm mass in right upper inner quadrant and an 8mm enhancing structure in right axillary tail w/o fatty hilum, possibly abnormal node, s/p US axilla 11/22/22 without any abnormal lymphadenopathy seen. \par \par Genetic testing [employer] negative 11/2022. \par \par Surgical pathology pT1b pN0 0.8cm % % Her2 negative, LCIS s/p R breast lumpectomy and SLNB 12/22/22 with Dr Garcia. \par Oncotype 15 \par Last DEXA 12/2022 osteopenia.\par Completed adj RT 3/30/23 w/ Dr Wernicke.\par On anastrazole since 12/5/23 tolerating it well. (pt could not undergo surgery untl 12/2022 due to PCI which was done 6/2022 requiring antiplatelet agents which patient could not stop for six months). \par \par To continue Arimidex 1mg/day, asked to take it with vitamin D/Ca daily as well. \par To continue Prolia with her endocrinologist (last dose 6/2023). \par \par Asked patient to f/u with Dr Garcia's team, reviewed surveillance imaging to be guided by him. \par \par RTC in 4 months for follow up.\par

## 2023-06-14 LAB
25(OH)D3 SERPL-MCNC: 49 NG/ML
ALBUMIN SERPL ELPH-MCNC: 4.4 G/DL
ALP BLD-CCNC: 52 U/L
ALT SERPL-CCNC: 16 U/L
ANION GAP SERPL CALC-SCNC: 18 MMOL/L
AST SERPL-CCNC: 21 U/L
BILIRUB SERPL-MCNC: 0.4 MG/DL
BUN SERPL-MCNC: 13 MG/DL
CALCIUM SERPL-MCNC: 9.7 MG/DL
CHLORIDE SERPL-SCNC: 100 MMOL/L
CO2 SERPL-SCNC: 26 MMOL/L
CREAT SERPL-MCNC: 0.54 MG/DL
EGFR: 98 ML/MIN/1.73M2
GLUCOSE SERPL-MCNC: 42 MG/DL
POTASSIUM SERPL-SCNC: 4.3 MMOL/L
PROT SERPL-MCNC: 7.2 G/DL
SODIUM SERPL-SCNC: 143 MMOL/L

## 2023-09-25 ENCOUNTER — NON-APPOINTMENT (OUTPATIENT)
Age: 73
End: 2023-09-25

## 2023-10-10 ENCOUNTER — APPOINTMENT (OUTPATIENT)
Dept: BREAST CENTER | Facility: CLINIC | Age: 73
End: 2023-10-10

## 2023-10-16 ENCOUNTER — APPOINTMENT (OUTPATIENT)
Dept: HEMATOLOGY ONCOLOGY | Facility: CLINIC | Age: 73
End: 2023-10-16
Payer: MEDICARE

## 2023-10-16 VITALS
HEART RATE: 73 BPM | RESPIRATION RATE: 18 BRPM | DIASTOLIC BLOOD PRESSURE: 70 MMHG | OXYGEN SATURATION: 97 % | BODY MASS INDEX: 30.73 KG/M2 | TEMPERATURE: 97.1 F | WEIGHT: 167 LBS | SYSTOLIC BLOOD PRESSURE: 125 MMHG | HEIGHT: 62 IN

## 2023-10-16 PROCEDURE — 99213 OFFICE O/P EST LOW 20 MIN: CPT

## 2023-10-16 RX ORDER — PRASUGREL 10 MG/1
10 TABLET, FILM COATED ORAL
Refills: 0 | Status: DISCONTINUED | COMMUNITY
End: 2023-10-16

## 2023-10-16 RX ORDER — ANASTROZOLE TABLETS 1 MG/1
1 TABLET ORAL
Qty: 90 | Refills: 3 | Status: ACTIVE | COMMUNITY
Start: 2022-12-05 | End: 1900-01-01

## 2023-10-24 ENCOUNTER — APPOINTMENT (OUTPATIENT)
Dept: BREAST CENTER | Facility: CLINIC | Age: 73
End: 2023-10-24

## 2023-11-14 ENCOUNTER — APPOINTMENT (OUTPATIENT)
Dept: BREAST CENTER | Facility: CLINIC | Age: 73
End: 2023-11-14
Payer: MEDICARE

## 2023-11-14 VITALS
DIASTOLIC BLOOD PRESSURE: 78 MMHG | HEIGHT: 62 IN | WEIGHT: 163 LBS | HEART RATE: 74 BPM | BODY MASS INDEX: 30 KG/M2 | SYSTOLIC BLOOD PRESSURE: 113 MMHG

## 2023-11-14 PROCEDURE — 99213 OFFICE O/P EST LOW 20 MIN: CPT

## 2023-12-13 NOTE — HISTORY OF PRESENT ILLNESS
[FreeTextEntry1] : Ms. Candy Dhillon is a 73 y.o female pmhx DM,HLD, HTN; famhx breast Ca newly diagnosed  [id number] Right breast invasive lobular carcinoma 0.8cm % % Her-2 negative, LCIS s/p Right breast lumpectomy and SLNB 12/22/22 with Dr. Garcia  referred by Dr. Barrientos for adjuvant RT.  Mass was originally palpated by patient which prompted a b/l breast US and stereotactic biopsy resulting in architectural distortion at the right breast 11:00 axis spanning 1.5cm and left breast 8:00 1cmFN  0.9 x 0.3 x 0.8 cm complex cysyt.  The biopsy pathology resulted as left breast cystic apocrine metaplasia and right breast Lobular carcinoma in situ, focally involving radial scar; post biopsy right breast hematoma. In the following month, November, a MRI showed a  mass measuring 1.7 x 1.0 x 1.6cm  in the upper inner right breast.  12/22/22 the patient underwent a right Magseed localized lumpectomy with lymphatic mapping, and sentinel node axillary dissection of right axilla w/ Dr. Garcia.  Pathology resulted as right breast Infiltrating lobular carcinoma, 0.8 cm, classic type, no evidence of lymphovascular invasion, LCIS, all margins negative for invasive carcinoma, 0/6LN negative.  Patient is currently on Anastrozole 1mg/day since 12/5/22.      [employer]ae test result negative (11/09/22) Oncotype 15 (12/22/22) Anastrozole 1mg/day since 12/5/22.    Bra size: 38D Age at menarche was  13 y.o Age at menopause was  51 y.o Prior pregnancies: G6 and P3 .Age at live birth: 33  Fertility Treatments: yes Birth Control Pill Use: no Breast Feeding History:yes  1/31/2023- New- today the patient presents  generally well. She denies fevers, chills, sob, n/v, diarrhea or constipation. Patient reports no nipple discharge, dimpling, pain or palpable masses in either breast.  Right breast has minor ecchymosis under breast and hematoma is palpable above incision site.   Pt directed to use heat packs per surgical team. Mometasone cream ordered and teaching provided.  Patient verbalized understanding.  She denies alcohol, tobacco or illicit drug use.  She is a  and lives in [place] with her children and .     6/4/2023- PTE: Ms. Dhillon completed 5 fractions of radiation therapy from 3/14-3/20/2023 2600cGy to the right breast. She presents today for post treatment evaluation. She continues to follow with Dr. Garcia with plan for mammogra/ultrasound with sozo measurement and exam  in 10/2023. Patient is still on anastrozole. Denies any sob, chest pain, dizziness, headache, fever, chills, hyperpigmentation. Skin is warm, non-tender to touch. No hyperpigmentation noted to b/l breast.   12/12/23 Follow up: Ms. Dhillon completed 5 fractions of radiation therapy from 3/14-3/20/2023 2600cGy to the right breast. She presents today for routine follow up. She continues to follow with Dr. Garcia (last seen 11/14/23) with plan for mammogram/ultrasound with sozo measurement and exam 5/2024.  Bilateral mammogram and ultrasound done 10/24/2023 showed no mammographic or ultrasonographic evidence of malignancy, BI-RADS 2.  ------------------------------------------------------------------------------------------------------------------------ 10/20/22 Left breast stereotactic biopsy  guided US ([facility])  - 0.9 x 0.3 x 0.8 cm complex cysyt at 8:00 1cmFN  10/20/22 Right breast stereotactic biopsy guided US  -architectural distortion at the right 11:00 axis  -Residual lesion not seen post core biopsy due to hematoma   10/20/22 b/l breast biopsy patho Right Breast:  -ESTROGEN RECEPTOR: POSITIVE 100% -PROGESTERONE RECEPTOR: POSITIVE 100%  -HER-2: NEGATIVE 1  Left 8:00 1cmfn - Cystic apocrine metaplasia. 2  Right breast 11:00 - Invasive lobular carcinoma, 5 mm in greatest microscopic dimension. - Lobular carcinoma in situ, focally involving radial scar. - Focal atypical duct hyperplasia. - Benign calcifications.  11/10/22 MRI b/l breast ([facility])  Right breast:  -upper inner right breast there is a 1.7 x 1.0 x 1.6cm mass exhibiting predominantly washout kinetics.  Significant biopsy site changes and a hematoma are surrounding the mass and extending inferiorly, posteriorly and laterally.   -there is a 8mm structure in the right axillary tail which may represent an abnormal lymph node.   Left breast:  -Lower inner breast: No suspicious enhancement detected, no significant left axillary or internal mammary lymphadenopathy, nipples and skin appear unremarkable.    11/22/22 Right breast US ([facility])  - no abnormal axillary lymph nodes.  Correlation with the sentinel node biopsy at the time of  lumpectomy is recommended -the hematoma measures 2cm    12/14/22 Right breast mammo guided needle localization ([facility])  - Right 11-12 o clock 7cm magseed loc of the hourglass clip in the right breast   12/21/22 NM Lymphoscintigraphy  -Right axillary sentinel lymph node   12/22/22 Right Magseed localized lumpectomy with lymphatic mapping, sentinel node axillary dissection of right axilla w/ Dr. Garcia   12/22/22 Surgical pathology  1.Right breast, upper outer quadrant, lumpectomy: - Infiltrating lobular carcinoma, 0.8 cm, classic type, not seen on margins - No evidence of lymphovascular invasion - Lobular carcinoma in situ, multifocal - Sclerosing adenosis, occasionally involved by lobular carcinoma in situ, multifocal - Fibrocystic changes with usual ductal hyperplasia and numerous microcalcifications - Biopsy site and healing wound  2. Right sentinel lymph node I, count 102, excision: - Negative for tumor, one lymph node  (0/1)  3. Right sentinel lymph node II, count 1455, excision: - Negative for tumor, one lymph node  (0/1)  4. Right sentinel lymph node III, count 525, excision: - Negative for tumor, one lymph node (0/1)  5. Right sentinel lymph node IV, count 487, excision: - Negative for tumor, one lymph node (0/1)  6. Right sentinel lymph node V, count 748, excision: - Negative for tumor, one lymph node  (o/1)  7. Right sentinel lymph node VI, count 394, excision: - Negative for tumor, one lymph node (0/1)  8. Medial margin, right breast, excision: - Negative for tumor; mammary tissue without significant pathologic features   9. Superior margin, right breast, excision: - Negative for tumor; mammary tissue without significant pathologic features  10. Lateral margin, right breast, excision: - Negative for tumor; mild fibrocystic changes with microcalcifications and focal biopsy site changes  11. Inferior margin, right breast, excision: - Negative for tumor; ectatic ducts present  12. Deep margin, right breast, excision: - Negative for tumor; fibrofatty tissue only  13. Anterior margin, right breast, excision: - Negative for tumor; mild fibrocystic changes with microcalcifications  Synoptic Summary INVASIVE CARCINOMA OF THE BREAST: Resection SPECIMEN Procedure:  Excision (less than total mastectomy) Specimen Laterality:  Right TUMOR Histologic Type:  Invasive lobular carcinoma Glandular (Acinar) / Tubular Differentiation:   Score 3 Nuclear Pleomorphism:  Score 2 Mitotic Rate:  Score 1 Overall Grade:  Grade 2 (scores of 6 or 7) Tumor Size: Greatest dimension of largest invasive focus (Millimeters) - 8 mm Ductal Carcinoma In Situ (DCIS):   Not identified Lobular Carcinoma In Situ (LCIS):   Present  Tumor Extent Tumor Extent:  Not applicable Lymphovascular Invasion:  Not identified Treatment Effect in the Breast:   No known presurgical therapy Treatment Effect in the Lymph Nodes:   Not applicable MARGINS Margin Status for Invasive Carcinoma:   All margins negative for invasive carcinoma Distance from Invasive Carcinoma to Closest Margin: Greater than -     7 mm Margin Status for DCIS:  Not applicable (no DCIS in specimen) REGIONAL LYMPH NODES Regional Lymph Node Status:  All regional lymph nodes negative for tumor Total Number of Lymph Nodes Examined (sentinel and non-sentinel):     6 Number of [place] Nodes Examined:   6 DISTANT METASTASIS Distant Site(s) Involved: Cannot be determined PATHOLOGIC STAGE CLASSIFICATION (pTNM, AJCC 8th Edition) Reporting of pT, pN, and (when applicable) pM categories is based on information available to the pathologist at the time the report is issued. As per the AJCC (Chapter 1, 8th Ed.) it is the managing physician's responsibility to establish the final pathologic stage based upon all pertinent information, including but potentially not limited to this pathology report. TNM Descriptors: Not applicable pT Category:  pT1b Regional Lymph Nodes Modifier:   Not applicable pN Category:  pN0 pM Category:  Not applicable - pM cannot be determined from the submitted specimen(s)  Clinical Information Right breast cancer lumpectomy with sentinel lymph node dissection and margins

## 2023-12-14 ENCOUNTER — APPOINTMENT (OUTPATIENT)
Dept: RADIATION ONCOLOGY | Facility: CLINIC | Age: 73
End: 2023-12-14

## 2024-02-12 ENCOUNTER — APPOINTMENT (OUTPATIENT)
Dept: HEMATOLOGY ONCOLOGY | Facility: CLINIC | Age: 74
End: 2024-02-12
Payer: MEDICARE

## 2024-02-12 VITALS
HEIGHT: 62 IN | BODY MASS INDEX: 30.18 KG/M2 | HEART RATE: 52 BPM | DIASTOLIC BLOOD PRESSURE: 56 MMHG | TEMPERATURE: 97.2 F | SYSTOLIC BLOOD PRESSURE: 109 MMHG | WEIGHT: 164 LBS | RESPIRATION RATE: 18 BRPM | OXYGEN SATURATION: 97 %

## 2024-02-12 DIAGNOSIS — D05.01 LOBULAR CARCINOMA IN SITU OF RIGHT BREAST: ICD-10-CM

## 2024-02-12 DIAGNOSIS — Z79.811 LONG TERM (CURRENT) USE OF AROMATASE INHIBITORS: ICD-10-CM

## 2024-02-12 PROCEDURE — 99214 OFFICE O/P EST MOD 30 MIN: CPT

## 2024-02-12 NOTE — HISTORY OF PRESENT ILLNESS
[de-identified] : 74 yo woman with PMHx including DM, HTN, CAD s/p PCI (with stent placement in June 2022), osteopenia on Prolia (managed by endo, Dr José Luis Beckwith) referred by Dr Garcia for a newly diagnosed right ILC % % Her 2 negative, LCIS and focally involving radial scar, seen on 11/2022 MRI breast as a 1.7cm mass in right upper inner quadrant and an 8mm enhancing structure in right axillary tail w/o fatty hilum, possibly abnormal node, s/p US axilla 11/22/22 without any abnormal lymphadenopathy seen.   Here today for f/u with Dr. Barrientos.   Patient currently on Prasugrel and baby aspirin at least until 12/14/2022 due to a stent which was placed June 2022. Patient awaiting clearance from PMD and cardiologist prior to breast surgery. Patient referred by Dr Amadou Garcia for consideration of neoadjuvant antiestrogen therapy prior to surgery.  [de-identified] : Patient presents for follow up. She is compliant with anastrozole. She denies any hot flashes, joint pain or muscle aches. Reports chronic back pain. Denies any chest pain, sob, abdominal pain, n/v/d/c. [FreeTextEntry1] : 74 yo female presents for newly diagnosed R invasive lobular carcinoma % % Her-2 negative, LCIS and focally involving radial scar, seen on diagnostic imaging as architectural distortion at R 11:00 spanning 1.5cm on US, originally palpated by patient. Benign, concordant US-guided biopsy performed same day of L 0.9cm complex cyst with recommendation for 6 month follow-up US. Patient has not had a breast MRI. \par  \par  \par  Patient reports history of diabetes, HLD, HTN, tremor. Of note, patient on blood thinners Prasugrel and baby aspirin as patient had a stent placed in June 2022, instructed by cardiologist to continue for 6 months until December 14, 2022. \par  \par  \par

## 2024-02-12 NOTE — REASON FOR VISIT
[Follow-Up Visit] : a follow-up [Follow-Up: _____] : a [unfilled] follow-up visit [FreeTextEntry2] : Early stage breast cancer  [FreeTextEntry1] : newly diagnosed R ILC

## 2024-02-12 NOTE — PHYSICAL EXAM
[Ambulatory and capable of all self care but unable to carry out any work activities] : Status 2- Ambulatory and capable of all self care but unable to carry out any work activities. Up and about more than 50% of waking hours [Normal] : affect appropriate [de-identified] : right breast with well healed surgical incision, some right breast RT changes, no hard lesions in either breast and axilla - exam 10/16/23

## 2024-02-12 NOTE — ASSESSMENT
[FreeTextEntry1] : 74 yo woman with PMHx including DM, HTN, CAD s/p PCI (with stent placement in June 2022), osteopenia on Prolia (managed by endo, Dr José Luis Beckwith, last administered 6/2023) referred by Dr Garcia for a right ILC % % Her 2 negative, LCIS and focally involving radial scar, seen on 11/2022 MRI breast as a 1.7cm mass in right upper inner quadrant and an 8mm enhancing structure in right axillary tail w/o fatty hilum, possibly abnormal node, s/p US axilla 11/22/22 without any abnormal lymphadenopathy seen.  Genetic testing [employer] negative 11/2022.  Surgical pathology pT1b pN0 0.8cm % % Her2 negative, LCIS s/p R breast lumpectomy and SLNB 12/22/22 with Dr Garcia.  Oncotype 15  Last DEXA 12/2022 osteopenia.  Completed adj RT 3/30/23 w/ Dr Wernicke.  On anastrazole since 12/5/22-present. (pt could not undergo surgery untl 12/2022 due to PCI which was done 6/2022 requiring antiplatelet agents which patient could not stop for six months).   Today complaining of fatigue, body aches, frequent hot flashes, headaches, weight gain and attributing it to Anastrazole. Since on it since 12/2022 unclear if due to the AI or another reason. Reviewed in detail and recommended holding the AI for two weeks and returning for f/u in 2-3 wks to reassess. Reviewed an alternative agent would be Exemestane.   To continue Prolia Q 6mo with her endocrinologist (6/2023, 12/2023) w/ vitamin D/Ca daily.   Asked patient to f/u with Dr Garcia's team, reviewed surveillance imaging to be guided by him. Mammo b/l diagnostic Birads 2 10/24/23.  Last colonoscopy sometime in 2021, asked pt to check with her GI when she is due for one again.  RTC in 2-3 wks for follow up.

## 2024-03-01 NOTE — HISTORY OF PRESENT ILLNESS
[de-identified] : 74 yo woman with PMHx including DM, HTN, CAD s/p PCI (with stent placement in June 2022), osteopenia on Prolia (managed by endo, Dr José Luis Beckwith) referred by Dr Garcia for a newly diagnosed right ILC % % Her 2 negative, LCIS and focally involving radial scar, seen on 11/2022 MRI breast as a 1.7cm mass in right upper inner quadrant and an 8mm enhancing structure in right axillary tail w/o fatty hilum, possibly abnormal node, s/p US axilla 11/22/22 without any abnormal lymphadenopathy seen.   Here today for f/u with Dr. Barrientos.   Patient currently on Prasugrel and baby aspirin at least until 12/14/2022 due to a stent which was placed June 2022. Patient awaiting clearance from PMD and cardiologist prior to breast surgery. Patient referred by Dr Amadou Garcia for consideration of neoadjuvant antiestrogen therapy prior to surgery.  [de-identified] : Patient presents for follow up. She is compliant with anastrozole. She denies any hot flashes, joint pain or muscle aches. Reports chronic back pain. Denies any chest pain, sob, abdominal pain, n/v/d/c. [FreeTextEntry1] : 74 yo female presents for newly diagnosed R invasive lobular carcinoma % % Her-2 negative, LCIS and focally involving radial scar, seen on diagnostic imaging as architectural distortion at R 11:00 spanning 1.5cm on US, originally palpated by patient. Benign, concordant US-guided biopsy performed same day of L 0.9cm complex cyst with recommendation for 6 month follow-up US. Patient has not had a breast MRI. \par  \par  \par  Patient reports history of diabetes, HLD, HTN, tremor. Of note, patient on blood thinners Prasugrel and baby aspirin as patient had a stent placed in June 2022, instructed by cardiologist to continue for 6 months until December 14, 2022. \par  \par  \par

## 2024-03-01 NOTE — PHYSICAL EXAM
[Ambulatory and capable of all self care but unable to carry out any work activities] : Status 2- Ambulatory and capable of all self care but unable to carry out any work activities. Up and about more than 50% of waking hours [Normal] : affect appropriate [de-identified] : right breast with well healed surgical incision, some right breast RT changes, no hard lesions in either breast and axilla - exam 10/16/23

## 2024-03-01 NOTE — ASSESSMENT
[FreeTextEntry1] : 72 yo woman with PMHx including DM, HTN, CAD s/p PCI (with stent placement in June 2022), osteopenia on Prolia (managed by endo, Dr José Luis Beckwith, last administered 6/2023) referred by Dr Garcia for a right ILC % % Her 2 negative, LCIS and focally involving radial scar, seen on 11/2022 MRI breast as a 1.7cm mass in right upper inner quadrant and an 8mm enhancing structure in right axillary tail w/o fatty hilum, possibly abnormal node, s/p US axilla 11/22/22 without any abnormal lymphadenopathy seen.  Genetic testing [employer]ae negative 11/2022.  Surgical pathology pT1b pN0 0.8cm % % Her2 negative, LCIS s/p R breast lumpectomy and SLNB 12/22/22 with Dr Garcia.  Oncotype 15  Last DEXA 12/2022 osteopenia.  Completed adj RT 3/30/23 w/ Dr Wernicke.  On anastrazole since 12/5/22-present. (pt could not undergo surgery untl 12/2022 due to PCI which was done 6/2022 requiring antiplatelet agents which patient could not stop for six months).   Today complaining of fatigue, body aches, frequent hot flashes, headaches, weight gain and attributing it to Anastrazole. Since on it since 12/2022 unclear if due to the AI or another reason. Reviewed in detail and recommended holding the AI for two weeks and returning for f/u in 2-3 wks to reassess. Reviewed an alternative agent would be Exemestane.   To continue Prolia Q 6mo with her endocrinologist (6/2023, 12/2023) w/ vitamin D/Ca daily.   Asked patient to f/u with Dr Garcia's team, reviewed surveillance imaging to be guided by him. Mammo b/l diagnostic Birads 2 10/24/23.  Last colonoscopy sometime in 2021, asked pt to check with her GI when she is due for one again.  RTC in 2-3 wks for follow up.

## 2024-03-01 NOTE — PAST MEDICAL HISTORY
[Menarche Age ____] : age at menarche was [unfilled] [History of Hormone Replacement Treatment] : has no history of hormone replacement treatment [Menopause Age____] : age at menopause was [unfilled] [Total Preg ___] : G[unfilled] [Ectopics ___] : ectopic pregnancies: [unfilled]  [Live Births ___] : P[unfilled]  [Age At Live Birth ___] : Age at live birth: [unfilled] [FreeTextEntry7] : no [FreeTextEntry6] : yes [FreeTextEntry8] : yes

## 2024-03-04 ENCOUNTER — APPOINTMENT (OUTPATIENT)
Dept: HEMATOLOGY ONCOLOGY | Facility: CLINIC | Age: 74
End: 2024-03-04

## 2024-05-02 PROBLEM — Z08 ENCOUNTER FOR FOLLOW-UP SURVEILLANCE OF BREAST CANCER: Status: ACTIVE | Noted: 2023-11-14

## 2024-05-02 PROBLEM — C50.911 INVASIVE LOBULAR CARCINOMA OF RIGHT BREAST IN FEMALE: Status: ACTIVE | Noted: 2022-10-27

## 2024-05-03 ENCOUNTER — APPOINTMENT (OUTPATIENT)
Dept: BREAST CENTER | Facility: CLINIC | Age: 74
End: 2024-05-03
Payer: MEDICARE

## 2024-05-03 VITALS
HEIGHT: 62 IN | WEIGHT: 62 LBS | SYSTOLIC BLOOD PRESSURE: 117 MMHG | HEART RATE: 69 BPM | BODY MASS INDEX: 11.41 KG/M2 | DIASTOLIC BLOOD PRESSURE: 71 MMHG

## 2024-05-03 DIAGNOSIS — Z85.3 ENCOUNTER FOR FOLLOW-UP EXAMINATION AFTER COMPLETED TREATMENT FOR MALIGNANT NEOPLASM: ICD-10-CM

## 2024-05-03 DIAGNOSIS — Z08 ENCOUNTER FOR FOLLOW-UP EXAMINATION AFTER COMPLETED TREATMENT FOR MALIGNANT NEOPLASM: ICD-10-CM

## 2024-05-03 DIAGNOSIS — C50.911 MALIGNANT NEOPLASM OF UNSPECIFIED SITE OF RIGHT FEMALE BREAST: ICD-10-CM

## 2024-05-03 PROCEDURE — 99213 OFFICE O/P EST LOW 20 MIN: CPT

## 2024-05-08 RX ORDER — CHLORHEXIDINE GLUCONATE, 0.12% ORAL RINSE 1.2 MG/ML
0.12 SOLUTION DENTAL
Qty: 473 | Refills: 0 | Status: ACTIVE | COMMUNITY
Start: 2024-02-11

## 2024-05-08 RX ORDER — LORAZEPAM 0.5 MG/1
0.5 TABLET ORAL
Qty: 30 | Refills: 0 | Status: ACTIVE | COMMUNITY
Start: 2024-03-11

## 2024-05-08 RX ORDER — LINACLOTIDE 145 UG/1
145 CAPSULE, GELATIN COATED ORAL
Qty: 30 | Refills: 0 | Status: ACTIVE | COMMUNITY
Start: 2024-01-31

## 2024-05-08 RX ORDER — NEOMYCIN SULFATE, POLYMYXIN B SULFATE, HYDROCORTISONE 3.5; 10000; 1 MG/ML; [USP'U]/ML; MG/ML
1 SOLUTION/ DROPS AURICULAR (OTIC)
Qty: 10 | Refills: 0 | Status: ACTIVE | COMMUNITY
Start: 2024-02-04

## 2024-05-08 RX ORDER — AZITHROMYCIN 500 MG/1
500 TABLET, FILM COATED ORAL
Qty: 3 | Refills: 0 | Status: ACTIVE | COMMUNITY
Start: 2024-03-07

## 2024-05-08 RX ORDER — TAVABOROLE 43.5 MG/ML
5 SOLUTION TOPICAL
Qty: 10 | Refills: 0 | Status: ACTIVE | COMMUNITY
Start: 2024-01-03

## 2024-05-08 RX ORDER — SULFAMETHOXAZOLE AND TRIMETHOPRIM 800; 160 MG/1; MG/1
800-160 TABLET ORAL
Qty: 14 | Refills: 0 | Status: ACTIVE | COMMUNITY
Start: 2024-02-19

## 2024-05-08 RX ORDER — FLUTICASONE PROPIONATE 50 UG/1
50 SPRAY, METERED NASAL
Qty: 16 | Refills: 0 | Status: ACTIVE | COMMUNITY
Start: 2023-12-03

## 2024-05-08 RX ORDER — DICYCLOMINE HYDROCHLORIDE 20 MG/1
20 TABLET ORAL
Qty: 90 | Refills: 0 | Status: ACTIVE | COMMUNITY
Start: 2023-10-15

## 2024-05-08 RX ORDER — TERBINAFINE HYDROCHLORIDE 250 MG/1
250 TABLET ORAL
Qty: 30 | Refills: 0 | Status: ACTIVE | COMMUNITY
Start: 2024-01-30

## 2024-05-08 RX ORDER — CLINDAMYCIN HYDROCHLORIDE 300 MG/1
300 CAPSULE ORAL
Qty: 28 | Refills: 0 | Status: ACTIVE | COMMUNITY
Start: 2024-01-10

## 2024-05-08 RX ORDER — PAROXETINE HYDROCHLORIDE 20 MG/1
20 TABLET, FILM COATED ORAL
Qty: 30 | Refills: 0 | Status: ACTIVE | COMMUNITY
Start: 2024-03-11

## 2024-05-08 RX ORDER — DAPAGLIFLOZIN 10 MG/1
10 TABLET, FILM COATED ORAL
Qty: 30 | Refills: 0 | Status: ACTIVE | COMMUNITY
Start: 2024-02-15

## 2024-05-08 RX ORDER — ATORVASTATIN CALCIUM 40 MG/1
40 TABLET, FILM COATED ORAL
Qty: 30 | Refills: 0 | Status: ACTIVE | COMMUNITY
Start: 2024-02-15

## 2024-05-08 NOTE — DATA REVIEWED
[FreeTextEntry1] : 6/24/2020 (R) b/l sMmg & US: heterogeneously dense. No mammographic or ultrasonographic evidence of malignancy. No significant change. BI-RADS 2.  6/23/21 (R) b/l sMmg & US: Benign findings. No mammographic or ultrasonographic evidence of malignancy. No significant change. BI-RADS 2.   10/6/22 (R) b/l dx mmg & US: heterogeneously dense. 1. Suspicious palpable architectural distortion at the right 11-12:00 axis which likely correlates with a region of abnormal echotexture spanning 1.5 cm on ultrasound at the 11:00 axis 4 cm from the nipple. Stereotactic biopsy is recommended. In addition clinical evaluation/surgical evaluation is recommended for the palpable finding. 2. Suspicious left 8:00 axis 1 cm from nipple 1 cm complex cluster of microcysts which had not been previously visualized. Ultrasound-guided needle biopsy is recommended. BI-RADS 4.   10/20/22 (R/[facility] Path) right stereo biopsy of architectural distortion at the right 11:00 axis (hourglass clip): Invasive lobular carcinoma, 5 mm in greatest microscopic dimension. Lobular carcinoma in situ, focally involving radial scar. Benign calcifications. Malignant, concordant.   10/20/22 (R/[facility] Path) US biopsy left of  0.9 x 0.3 x 0.8 complex cyst at the left 8:00 axis 1cmfn (heart-shaped clip): Cystic apocrine metaplasia. Benign, concordant. FOLLOW-UP: A six-month follow up  ultrasound is recommended.   11/10/22 B/L MRI (R): Mass in the upper inner right breast likely representing the patient's recently diagnosed invasive lobular carcinoma. Recommend appropriate action. Questionable abnormal lymph node in the axillary tail of the right breast. Recommend targeted right breast ultrasound for further evaluation.  11/12/22 R Targeted US (R): No abnormal axillary lymph nodes appreciated on ultrasound as was seen on MRI. Correlation with the sentinel node biopsy at the time of lumpectomy, is recommended. A thick-walled 2.0 cm hematoma is seen in the right breast 1:00 -N 3. Interestingly, it has a very large feeding artery and draining vein reminiscent of AV shunting.  12/15/22 R Lumpectomy and SLNB Pathology: 0.8 cm infiltrating lobular carcinoma no evidence of lymphovascular invasion. Biopsy site changes seen, (0/6) lymph nodes negative for tumor, margins negative. Denies any fever, chills, redness, pain, or discharge at the incision site.  10/24/23: B/L sMMG & US (R)- Dense. No suspicious masses, architectural distortion, or significant calcifications are detected. R post surgical changes. Multiple surgical clips are now seen in the right breast. Benign-appearing calcifications are seen in both breasts. Bilateral biopsy clips are seen. US- Right breast: 1 o'clock, 3 cm from the nipple, 0.5 x 0.3 x 0.5 cm complicated cystic structure likely sequelae of prior known hematoma at this location and significantly decreased in size compared to November 2022. BIRADS 2

## 2024-05-08 NOTE — PAST MEDICAL HISTORY
[Postmenopausal] : The patient is postmenopausal [Menarche Age ____] : age at menarche was [unfilled] [Menopause Age____] : age at menopause was [unfilled] [Total Preg ___] : G[unfilled] [Live Births ___] : P[unfilled]  [Ectopics ___] : ectopic pregnancies: [unfilled]  [Age At Live Birth ___] : Age at live birth: [unfilled] [History of Hormone Replacement Treatment] : has no history of hormone replacement treatment [FreeTextEntry6] : yes [FreeTextEntry2] : 1 miscarriage [FreeTextEntry7] : no [FreeTextEntry8] : yes

## 2024-05-08 NOTE — ASSESSMENT
[FreeTextEntry1] : 75 yo presents for follow up with h/o RIGHT lumpectomy and SLNB on 12/22/22 for RIGHT invasive lobular carcinoma % % Her-2 negative, LCIS and focally involving radial scar, seen on diagnostic imaging as architectural distortion at R 11:00 spanning 1.5cm on US, originally palpated by patient. Course complicated by right breast and axillary swelling likely hematoma. Final surgical pathology yielded 0.8 cm infiltrating lobular carcinoma no evidence of lymphovascular invasion. Biopsy site changes seen, (0/6) lymph nodes negative for tumor, margins negative.  Oncotype RS 15. S/p XRT. Patient without complaint. Physical exam WNL. Sozo measurement obtained in office today, WNL. B/L DX MG & US in 10/2023 within normal limits.  Plan for B/L DX MMG/US, re-examination and repeat sozo measurement in Oct 2024. Patient verbalized understanding and agreement of plan.

## 2024-05-08 NOTE — PHYSICAL EXAM
[Supple] : supple [No Supraclavicular Adenopathy] : no supraclavicular adenopathy [Examined in the supine and seated position] : examined in the supine and seated position [Asymmetrical] : asymmetrical [No dominant masses] : no dominant masses in right breast  [No dominant masses] : no dominant masses left breast [No Axillary Lymphadenopathy] : no left axillary lymphadenopathy [No Edema] : no edema [Breast Nipple Inversion] : nipples not inverted [Breast Nipple Retraction] : nipples not retracted [Breast Nipple Flattening] : nipples not flattened [Breast Nipple Fissures] : nipples not fissured [de-identified] : well-healed incision, some retraction superiorly from lumpectomy.  No masses palpable.

## 2024-07-09 ENCOUNTER — APPOINTMENT (OUTPATIENT)
Dept: RADIATION ONCOLOGY | Facility: CLINIC | Age: 74
End: 2024-07-09
Payer: MEDICARE

## 2024-07-09 VITALS
RESPIRATION RATE: 17 BRPM | TEMPERATURE: 98.1 F | DIASTOLIC BLOOD PRESSURE: 70 MMHG | OXYGEN SATURATION: 96 % | HEART RATE: 68 BPM | SYSTOLIC BLOOD PRESSURE: 145 MMHG

## 2024-07-09 PROCEDURE — 99213 OFFICE O/P EST LOW 20 MIN: CPT

## 2024-08-21 ENCOUNTER — APPOINTMENT (OUTPATIENT)
Dept: HEMATOLOGY ONCOLOGY | Facility: CLINIC | Age: 74
End: 2024-08-21
Payer: MEDICARE

## 2024-08-21 VITALS
SYSTOLIC BLOOD PRESSURE: 144 MMHG | HEART RATE: 71 BPM | HEIGHT: 62 IN | RESPIRATION RATE: 18 BRPM | TEMPERATURE: 98.8 F | OXYGEN SATURATION: 97 % | WEIGHT: 169 LBS | DIASTOLIC BLOOD PRESSURE: 78 MMHG | BODY MASS INDEX: 31.1 KG/M2

## 2024-08-21 DIAGNOSIS — C50.911 MALIGNANT NEOPLASM OF UNSPECIFIED SITE OF RIGHT FEMALE BREAST: ICD-10-CM

## 2024-08-21 DIAGNOSIS — Z87.39 PERSONAL HISTORY OF OTHER DISEASES OF THE MUSCULOSKELETAL SYSTEM AND CONNECTIVE TISSUE: ICD-10-CM

## 2024-08-21 PROCEDURE — 99215 OFFICE O/P EST HI 40 MIN: CPT

## 2024-08-21 NOTE — ASSESSMENT
[FreeTextEntry1] : 75 yo woman with PMHx including DM, HTN, CAD s/p PCI (with stent placement in June 2022), osteopenia on Prolia (managed by endo, Dr José Luis Beckwith, last administered 7/31/24) and Stage IA pT1bN0 right ILC % % Her 2 negative, Oncotype RS 15 s/p 12/22/22 R breast lumpectomy and SLNB 12/22/22 with Dr Garcia, adjuvant RT (completed 3/30/23) on endocrine therapy.  On anastrozole since 12/5/22-present. (pt could not undergo surgery until 12/2022 due to PCI which was done 6/2022 requiring antiplatelet agents which patient could not stop for six months).  Genetic testing [employer] negative 11/2022.  Prolia Q 6mo with her endocrinologist (last dose 7/31/24) w/ vitamin D/Ca daily.   10/24/23 MG/US CK  Reviewed prior records.  No evidence of recurrence clinically.  - Continue anastrozole for 5 years. - Nutritionist referral for weight gain, concerned that it is due to anastrozole. - Encouraged patient to resume swimming for exercise as tolerated.  Weight gain may be due to physical inactivity since her cardiac stent placement.  - Occupational therapy referral to help with right breast pain/scar. - Annual MG/US and f/u Dr. Garcia in October 2024. - F/u 6 months with Dr. Marques.

## 2024-08-21 NOTE — HISTORY OF PRESENT ILLNESS
[Disease: _____________________] : Disease: [unfilled] [T: ___] : T[unfilled] [N: ___] : N[unfilled] [M: ___] : M[unfilled] [AJCC Stage: ____] : AJCC Stage: [unfilled] [de-identified] : 75 yo woman with PMHx including DM, HTN, CAD s/p PCI (with stent placement in June 2022), osteopenia on Prolia (managed by endo, Dr José Luis Beckwith) and T1bN0 right ILC % % Her 2 negative, Oncotype RS 15 s/p 12/22/22 right lumpectomy/SLNB, adjuvant RT (completed 3/30/23), is here for f/u.  Her surgery was delayed due to cardiac stent placement and being on prasugrel and ASA until 12/14/22.  She has been on anastrozole since 12/2022.  She was previously under the care of Dr. Barrientos.   [de-identified] : moderately differentiated invasive lobular carcinoma [de-identified] : %, %, HER2-, Oncotype RS 15 [de-identified] : 11/2022 [employer] genetic testing negative [de-identified] : Patient presents for follow up. She reports persistent right 11:00 breast pain over scar area.  She is compliant with anastrozole. She denies any hot flashes, joint pain or muscle aches. Reports chronic back pain, herniated discs in the neck, b/l hand OA. Denies any chest pain, sob, abdominal pain, n/v/d/c.  She had her last Prolia injection on 7/31/24 with her endocrinologist.  She thinks anastrozole is causing weight gain. She denies change in diet but she is not physically active.  She used to swim a lot but has been afraid to since she had the cardiac stent.  Dr. Barrientos had suggested holding anastrozole for 2 weeks and possibly changing it to exemestane at the last visit, but the patient does not recall this and did not stop taking the medication.    She is scheduled for her annual MG/US and follow up with Dr. Garcia in October 2024.  She had an EGD and colonoscopy 2024, WNL. Had root canal earlier this year.

## 2024-08-21 NOTE — RESULTS/DATA
[FreeTextEntry1] : 12/28/15 DEXA: osteoporosis of right forearm (T score -2.9). Lumbar spine T score -1.5, right femur T score -1.4, right total hip -0.9.  10/6/22 ([facility]) b/l dx mmg & US: heterogeneously dense. 1. Suspicious palpable architectural distortion at the right 11-12:00 axis which likely correlates with a region of abnormal echotexture spanning 1.5 cm on ultrasound at the 11:00 axis 4 cm from the nipple. Stereotactic biopsy is recommended. In addition clinical evaluation/surgical evaluation is recommended for the palpable finding. 2. Suspicious left 8:00 axis 1 cm from nipple 1 cm complex cluster of microcysts which had not been previously visualized. Ultrasound-guided needle biopsy is recommended. BI-RADS 4.  10/19/22 DEXA: osteopenia of the left hip T score -1.3.  Lumbar spine -0.6, left total hip T score -0.6.  10/20/22 ([facility]) right stereo biopsy of architectural distortion at the right 11:00 axis (hourglass clip): Invasive lobular carcinoma, 5 mm in greatest microscopic dimension. Lobular carcinoma in situ, focally involving radial scar. Benign calcifications. Malignant, concordant.  10/20/22 ([facility]) US biopsy left of 0.9 x 0.3 x 0.8 complex cyst at the left 8:00 axis 1cmfn (heart-shaped clip): Cystic apocrine metaplasia. Benign, concordant. FOLLOW-UP: A six-month follow up ultrasound is recommended.  11/10/22 B/L MRI (R): Mass in the upper inner right breast likely representing the patient's recently diagnosed invasive lobular carcinoma. Recommend appropriate action. Questionable abnormal lymph node in the axillary tail of the right breast. Recommend targeted right breast ultrasound for further evaluation.  11/12/22 R Targeted US (R): No abnormal axillary lymph nodes appreciated on ultrasound as was seen on MRI. Correlation with the sentinel node biopsy at the time of lumpectomy, is recommended. A thick-walled 2.0 cm hematoma is seen in the right breast 1:00 -N 3. Interestingly, it has a very large feeding artery and draining vein reminiscent of AV shunting.  12/15/22 R Lumpectomy and [facility] Pathology: 0.8 cm infiltrating lobular carcinoma no evidence of lymphovascular invasion. Biopsy site changes seen, (0/6) lymph nodes negative for tumor, margins negative. Denies any fever, chills, redness, pain, or discharge at the incision site.  10/24/23: B/L sMMG & US (R)- Dense. No suspicious masses, architectural distortion, or significant calcifications are detected. R post surgical changes. Multiple surgical clips are now seen in the right breast. Benign-appearing calcifications are seen in both breasts. Bilateral biopsy clips are seen. US- Right breast: 1 o'clock, 3 cm from the nipple, 0.5 x 0.3 x 0.5 cm complicated cystic structure likely sequelae of prior known hematoma at this location and significantly decreased in size compared to November 2022. BIRADS 2.

## 2024-08-21 NOTE — PHYSICAL EXAM
[Ambulatory and capable of all self care but unable to carry out any work activities] : Status 2- Ambulatory and capable of all self care but unable to carry out any work activities. Up and about more than 50% of waking hours [Normal] : normal spine exam without palpable tenderness, no kyphosis or scoliosis [de-identified] : right breast with well healed surgical incision, some right breast RT changes, no hard lesions in either breast and axilla

## 2024-08-21 NOTE — PHYSICAL EXAM
[Ambulatory and capable of all self care but unable to carry out any work activities] : Status 2- Ambulatory and capable of all self care but unable to carry out any work activities. Up and about more than 50% of waking hours [Normal] : normal spine exam without palpable tenderness, no kyphosis or scoliosis [de-identified] : right breast with well healed surgical incision, some right breast RT changes, no hard lesions in either breast and axilla

## 2024-09-03 ENCOUNTER — NON-APPOINTMENT (OUTPATIENT)
Age: 74
End: 2024-09-03

## 2024-09-09 ENCOUNTER — NON-APPOINTMENT (OUTPATIENT)
Age: 74
End: 2024-09-09

## 2024-10-08 ENCOUNTER — NON-APPOINTMENT (OUTPATIENT)
Age: 74
End: 2024-10-08

## 2024-10-22 ENCOUNTER — APPOINTMENT (OUTPATIENT)
Dept: BREAST CENTER | Facility: CLINIC | Age: 74
End: 2024-10-22
Payer: MEDICARE

## 2024-10-22 VITALS
SYSTOLIC BLOOD PRESSURE: 121 MMHG | WEIGHT: 169 LBS | HEIGHT: 62 IN | HEART RATE: 70 BPM | DIASTOLIC BLOOD PRESSURE: 71 MMHG | BODY MASS INDEX: 31.1 KG/M2

## 2024-10-22 DIAGNOSIS — Z85.3 ENCOUNTER FOR FOLLOW-UP EXAMINATION AFTER COMPLETED TREATMENT FOR MALIGNANT NEOPLASM: ICD-10-CM

## 2024-10-22 DIAGNOSIS — C50.911 MALIGNANT NEOPLASM OF UNSPECIFIED SITE OF RIGHT FEMALE BREAST: ICD-10-CM

## 2024-10-22 DIAGNOSIS — Z08 ENCOUNTER FOR FOLLOW-UP EXAMINATION AFTER COMPLETED TREATMENT FOR MALIGNANT NEOPLASM: ICD-10-CM

## 2024-10-22 PROCEDURE — 93702 BIS XTRACELL FLUID ANALYSIS: CPT

## 2024-10-22 PROCEDURE — 99213 OFFICE O/P EST LOW 20 MIN: CPT

## 2025-03-13 ENCOUNTER — APPOINTMENT (OUTPATIENT)
Dept: HEMATOLOGY ONCOLOGY | Facility: CLINIC | Age: 75
End: 2025-03-13
Payer: MEDICARE

## 2025-03-13 ENCOUNTER — NON-APPOINTMENT (OUTPATIENT)
Age: 75
End: 2025-03-13

## 2025-03-13 VITALS
HEIGHT: 62 IN | HEART RATE: 73 BPM | DIASTOLIC BLOOD PRESSURE: 69 MMHG | SYSTOLIC BLOOD PRESSURE: 118 MMHG | OXYGEN SATURATION: 99 % | BODY MASS INDEX: 30.55 KG/M2 | WEIGHT: 166 LBS | RESPIRATION RATE: 18 BRPM

## 2025-03-13 DIAGNOSIS — C50.911 MALIGNANT NEOPLASM OF UNSPECIFIED SITE OF RIGHT FEMALE BREAST: ICD-10-CM

## 2025-03-13 PROCEDURE — 99204 OFFICE O/P NEW MOD 45 MIN: CPT

## 2025-03-13 PROCEDURE — G2211 COMPLEX E/M VISIT ADD ON: CPT

## 2025-07-15 ENCOUNTER — APPOINTMENT (OUTPATIENT)
Dept: RADIATION ONCOLOGY | Facility: CLINIC | Age: 75
End: 2025-07-15
Payer: MEDICARE

## 2025-07-15 VITALS
DIASTOLIC BLOOD PRESSURE: 68 MMHG | WEIGHT: 166 LBS | HEIGHT: 62 IN | SYSTOLIC BLOOD PRESSURE: 113 MMHG | BODY MASS INDEX: 30.55 KG/M2 | HEART RATE: 66 BPM | TEMPERATURE: 97.4 F | RESPIRATION RATE: 18 BRPM | OXYGEN SATURATION: 98 %

## 2025-07-15 PROCEDURE — 99213 OFFICE O/P EST LOW 20 MIN: CPT

## (undated) DEVICE — SUT VICRYL 2-0 27" CT-1 UNDYED

## (undated) DEVICE — SUT SILK 2-0 30" SH

## (undated) DEVICE — ELCTR BOVIE TIP BLADE INSULATED 2.8" EDGE WITH SAFETY

## (undated) DEVICE — DRSG STERISTRIPS 0.5 X 4"

## (undated) DEVICE — DRAPE TOP SHEET 53" X 101"

## (undated) DEVICE — GLV 7.5 PROTEXIS (WHITE)

## (undated) DEVICE — ELCTR PENCIL SMOKE EVACUATOR COATED PUSH BUTTON 70MM

## (undated) DEVICE — SUT VICRYL 4-0 27" SH UNDYED

## (undated) DEVICE — DRSG TELFA 3 X 8

## (undated) DEVICE — PREP CHLORAPREP HI-LITE ORANGE 26ML

## (undated) DEVICE — Device

## (undated) DEVICE — NAVIGATOR ENVISIO NAV SLIM

## (undated) DEVICE — ELCTR BOVIE TIP BLADE MEGADYNE E-Z CLEAN 4" EXTENDED

## (undated) DEVICE — DRAPE SPLIT SHEET 77" X 108"

## (undated) DEVICE — VENODYNE/SCD SLEEVE CALF MEDIUM

## (undated) DEVICE — DRAPE PROBE COVER 5" X 96"

## (undated) DEVICE — PACK GENERAL MINOR